# Patient Record
Sex: FEMALE | Race: WHITE | NOT HISPANIC OR LATINO | Employment: FULL TIME | URBAN - METROPOLITAN AREA
[De-identification: names, ages, dates, MRNs, and addresses within clinical notes are randomized per-mention and may not be internally consistent; named-entity substitution may affect disease eponyms.]

---

## 2017-06-21 ENCOUNTER — HOSPITAL ENCOUNTER (OUTPATIENT)
Dept: GENERAL RADIOLOGY | Facility: HOSPITAL | Age: 27
Discharge: HOME | End: 2017-06-21
Attending: NURSE PRACTITIONER | Admitting: NURSE PRACTITIONER

## 2017-07-27 ENCOUNTER — ON CAMPUS - OUTPATIENT (OUTPATIENT)
Dept: URBAN - METROPOLITAN AREA HOSPITAL 77 | Facility: HOSPITAL | Age: 27
End: 2017-07-27
Payer: COMMERCIAL

## 2017-07-27 DIAGNOSIS — K62.5 HEMORRHAGE OF ANUS AND RECTUM: ICD-10-CM

## 2017-07-27 DIAGNOSIS — K64.8 OTHER HEMORRHOIDS: ICD-10-CM

## 2017-07-27 PROCEDURE — 45378 DIAGNOSTIC COLONOSCOPY: CPT | Performed by: INTERNAL MEDICINE

## 2017-07-31 ENCOUNTER — HOSPITAL ENCOUNTER (OUTPATIENT)
Dept: OTHER | Facility: HOSPITAL | Age: 27
Setting detail: SPECIMEN
Discharge: HOME | End: 2017-07-31
Attending: NURSE PRACTITIONER | Admitting: NURSE PRACTITIONER

## 2017-07-31 LAB
ALBUMIN SERPL-MCNC: 4.2 G/DL (ref 3.5–4.8)
ALBUMIN/GLOB SERPL: 1.8 {RATIO} (ref 1–1.7)
ALP SERPL-CCNC: 51 IU/L (ref 32–91)
ALT SERPL-CCNC: 13 IU/L (ref 14–54)
ANION GAP SERPL CALC-SCNC: 17.7 MMOL/L (ref 10–20)
AST SERPL-CCNC: 18 IU/L (ref 15–41)
BASOPHILS # BLD AUTO: 0.1 10*3/UL (ref 0–0.2)
BASOPHILS NFR BLD AUTO: 1 % (ref 0–2)
BILIRUB SERPL-MCNC: 0.5 MG/DL (ref 0.3–1.2)
BUN SERPL-MCNC: 8 MG/DL (ref 8–20)
BUN/CREAT SERPL: 11.4 (ref 5.4–26.2)
CALCIUM SERPL-MCNC: 9.4 MG/DL (ref 8.9–10.3)
CHLORIDE SERPL-SCNC: 100 MMOL/L (ref 101–111)
CONV CO2: 28 MMOL/L (ref 22–32)
CONV TOTAL PROTEIN: 6.5 G/DL (ref 6.1–7.9)
CREAT UR-MCNC: 0.7 MG/DL (ref 0.4–1)
DIFFERENTIAL METHOD BLD: (no result)
EOSINOPHIL # BLD AUTO: 0.2 10*3/UL (ref 0–0.3)
EOSINOPHIL # BLD AUTO: 3 % (ref 0–3)
ERYTHROCYTE [DISTWIDTH] IN BLOOD BY AUTOMATED COUNT: 12.4 % (ref 11.5–14.5)
GLOBULIN UR ELPH-MCNC: 2.3 G/DL (ref 2.5–3.8)
GLUCOSE SERPL-MCNC: 76 MG/DL (ref 65–99)
HCT VFR BLD AUTO: 38.9 % (ref 35–49)
HGB BLD-MCNC: 13.4 G/DL (ref 12–15)
IRON SATN MFR SERPL: 51 % (ref 15–50)
IRON SERPL-MCNC: 174 UG/DL (ref 28–170)
LYMPHOCYTES # BLD AUTO: 2 10*3/UL (ref 0.8–4.8)
LYMPHOCYTES NFR BLD AUTO: 32 % (ref 18–42)
MCH RBC QN AUTO: 33.5 PG (ref 26–32)
MCHC RBC AUTO-ENTMCNC: 34.3 G/DL (ref 32–36)
MCV RBC AUTO: 97.5 FL (ref 80–94)
MONOCYTES # BLD AUTO: 0.5 10*3/UL (ref 0.1–1.3)
MONOCYTES NFR BLD AUTO: 8 % (ref 2–11)
NEUTROPHILS # BLD AUTO: 3.6 10*3/UL (ref 2.3–8.6)
NEUTROPHILS NFR BLD AUTO: 56 % (ref 50–75)
NRBC BLD AUTO-RTO: 0 /100{WBCS}
NRBC/RBC NFR BLD MANUAL: 0 10*3/UL
PLATELET # BLD AUTO: 239 10*3/UL (ref 150–450)
PMV BLD AUTO: 8.5 FL (ref 7.4–10.4)
POTASSIUM SERPL-SCNC: 3.7 MMOL/L (ref 3.6–5.1)
RBC # BLD AUTO: 3.99 10*6/UL (ref 4–5.4)
SODIUM SERPL-SCNC: 142 MMOL/L (ref 136–144)
TIBC SERPL-MCNC: 339 UG/DL (ref 228–428)
TSH SERPL-ACNC: 0.45 UIU/ML (ref 0.34–5.6)
VIT B12 SERPL-MCNC: 582 PG/ML (ref 180–914)
WBC # BLD AUTO: 6.3 10*3/UL (ref 4.5–11.5)

## 2017-09-13 ENCOUNTER — HOSPITAL ENCOUNTER (OUTPATIENT)
Dept: PHYSICAL THERAPY | Facility: HOSPITAL | Age: 27
Setting detail: RECURRING SERIES
Discharge: HOME | End: 2017-11-29
Attending: PHYSICIAN ASSISTANT | Admitting: PHYSICIAN ASSISTANT

## 2017-10-13 ENCOUNTER — HOSPITAL ENCOUNTER (OUTPATIENT)
Dept: INFUSION THERAPY | Facility: HOSPITAL | Age: 27
Discharge: HOME | End: 2017-10-13
Attending: INTERNAL MEDICINE | Admitting: INTERNAL MEDICINE

## 2017-10-13 LAB
CORTIS 1H P CHAL SERPL-MCNC: 25.8 UG/DL
CORTIS 30M P ACTH SERPL-MCNC: 25.5 UG/DL
CORTIS BS SERPL-MCNC: 19.9 UG/DL (ref 4–22)

## 2018-02-19 ENCOUNTER — ON CAMPUS - OUTPATIENT (OUTPATIENT)
Dept: URBAN - METROPOLITAN AREA HOSPITAL 85 | Facility: HOSPITAL | Age: 28
End: 2018-02-19
Payer: COMMERCIAL

## 2018-02-19 DIAGNOSIS — R50.9 FEVER, UNSPECIFIED: ICD-10-CM

## 2018-02-19 DIAGNOSIS — R11.2 NAUSEA WITH VOMITING, UNSPECIFIED: ICD-10-CM

## 2018-02-19 DIAGNOSIS — R93.3 ABNORMAL FINDINGS ON DIAGNOSTIC IMAGING OF OTHER PARTS OF DI: ICD-10-CM

## 2018-02-19 DIAGNOSIS — R19.7 DIARRHEA, UNSPECIFIED: ICD-10-CM

## 2018-02-19 DIAGNOSIS — R10.9 UNSPECIFIED ABDOMINAL PAIN: ICD-10-CM

## 2018-02-19 PROCEDURE — 99223 1ST HOSP IP/OBS HIGH 75: CPT | Performed by: NURSE PRACTITIONER

## 2018-02-20 ENCOUNTER — ON CAMPUS - OUTPATIENT (OUTPATIENT)
Dept: URBAN - METROPOLITAN AREA HOSPITAL 85 | Facility: HOSPITAL | Age: 28
End: 2018-02-20
Payer: COMMERCIAL

## 2018-02-20 DIAGNOSIS — R10.31 RIGHT LOWER QUADRANT PAIN: ICD-10-CM

## 2018-02-20 DIAGNOSIS — R11.2 NAUSEA WITH VOMITING, UNSPECIFIED: ICD-10-CM

## 2018-02-20 DIAGNOSIS — R19.7 DIARRHEA, UNSPECIFIED: ICD-10-CM

## 2018-02-20 DIAGNOSIS — R10.11 RIGHT UPPER QUADRANT PAIN: ICD-10-CM

## 2018-02-20 DIAGNOSIS — R93.3 ABNORMAL FINDINGS ON DIAGNOSTIC IMAGING OF OTHER PARTS OF DI: ICD-10-CM

## 2018-02-20 PROCEDURE — 99212 OFFICE O/P EST SF 10 MIN: CPT | Performed by: NURSE PRACTITIONER

## 2018-02-21 ENCOUNTER — ON CAMPUS - OUTPATIENT (OUTPATIENT)
Dept: URBAN - METROPOLITAN AREA HOSPITAL 85 | Facility: HOSPITAL | Age: 28
End: 2018-02-21
Payer: COMMERCIAL

## 2018-02-21 DIAGNOSIS — R10.31 RIGHT LOWER QUADRANT PAIN: ICD-10-CM

## 2018-02-21 DIAGNOSIS — R93.3 ABNORMAL FINDINGS ON DIAGNOSTIC IMAGING OF OTHER PARTS OF DI: ICD-10-CM

## 2018-02-21 DIAGNOSIS — R10.11 RIGHT UPPER QUADRANT PAIN: ICD-10-CM

## 2018-02-21 DIAGNOSIS — R11.2 NAUSEA WITH VOMITING, UNSPECIFIED: ICD-10-CM

## 2018-02-21 DIAGNOSIS — R19.7 DIARRHEA, UNSPECIFIED: ICD-10-CM

## 2018-02-21 PROCEDURE — 99213 OFFICE O/P EST LOW 20 MIN: CPT | Performed by: INTERNAL MEDICINE

## 2018-02-26 ENCOUNTER — OFFICE (OUTPATIENT)
Dept: URBAN - METROPOLITAN AREA CLINIC 64 | Facility: CLINIC | Age: 28
End: 2018-02-26
Payer: COMMERCIAL

## 2018-02-26 VITALS
HEART RATE: 76 BPM | SYSTOLIC BLOOD PRESSURE: 97 MMHG | WEIGHT: 74 LBS | DIASTOLIC BLOOD PRESSURE: 61 MMHG | HEIGHT: 61 IN

## 2018-02-26 DIAGNOSIS — R11.2 NAUSEA WITH VOMITING, UNSPECIFIED: ICD-10-CM

## 2018-02-26 DIAGNOSIS — K59.1 FUNCTIONAL DIARRHEA: ICD-10-CM

## 2018-02-26 DIAGNOSIS — R10.31 RIGHT LOWER QUADRANT PAIN: ICD-10-CM

## 2018-02-26 PROCEDURE — 99213 OFFICE O/P EST LOW 20 MIN: CPT | Performed by: NURSE PRACTITIONER

## 2018-02-26 RX ORDER — PROMETHAZINE HYDROCHLORIDE 25 MG/1
50 TABLET ORAL
Qty: 30 | Refills: 0 | Status: ACTIVE

## 2018-02-26 RX ORDER — COLESTIPOL HYDROCHLORIDE 1 G/1
2 TABLET, FILM COATED ORAL
Qty: 60 | Refills: 11 | Status: ACTIVE
Start: 2018-02-26

## 2018-02-28 ENCOUNTER — OFFICE (OUTPATIENT)
Dept: URBAN - METROPOLITAN AREA LAB 2 | Facility: LAB | Age: 28
End: 2018-02-28
Payer: COMMERCIAL

## 2018-02-28 DIAGNOSIS — K59.1 FUNCTIONAL DIARRHEA: ICD-10-CM

## 2018-02-28 PROCEDURE — 87507 IADNA-DNA/RNA PROBE TQ 12-25: CPT | Performed by: NURSE PRACTITIONER

## 2018-06-13 ENCOUNTER — HOSPITAL ENCOUNTER (OUTPATIENT)
Dept: OTHER | Facility: HOSPITAL | Age: 28
Setting detail: SPECIMEN
Discharge: HOME | End: 2018-06-13
Attending: NURSE PRACTITIONER | Admitting: NURSE PRACTITIONER

## 2018-06-13 LAB
ALBUMIN SERPL-MCNC: 4.4 G/DL (ref 3.5–4.8)
ALBUMIN/GLOB SERPL: 1.8 {RATIO} (ref 1–1.7)
ALP SERPL-CCNC: 53 IU/L (ref 32–91)
ALT SERPL-CCNC: 15 IU/L (ref 14–54)
ANION GAP SERPL CALC-SCNC: 11.2 MMOL/L (ref 10–20)
AST SERPL-CCNC: 20 IU/L (ref 15–41)
BASOPHILS # BLD AUTO: 0 10*3/UL (ref 0–0.2)
BASOPHILS NFR BLD AUTO: 1 % (ref 0–2)
BILIRUB SERPL-MCNC: 0.4 MG/DL (ref 0.3–1.2)
BUN SERPL-MCNC: 15 MG/DL (ref 8–20)
BUN/CREAT SERPL: 18.8 (ref 5.4–26.2)
CALCIUM SERPL-MCNC: 9.7 MG/DL (ref 8.9–10.3)
CHLORIDE SERPL-SCNC: 100 MMOL/L (ref 101–111)
CONV CO2: 30 MMOL/L (ref 22–32)
CONV TOTAL PROTEIN: 6.8 G/DL (ref 6.1–7.9)
CREAT UR-MCNC: 0.8 MG/DL (ref 0.4–1)
DIFFERENTIAL METHOD BLD: (no result)
EOSINOPHIL # BLD AUTO: 0.1 10*3/UL (ref 0–0.3)
EOSINOPHIL # BLD AUTO: 2 % (ref 0–3)
ERYTHROCYTE [DISTWIDTH] IN BLOOD BY AUTOMATED COUNT: 13.2 % (ref 11.5–14.5)
GLOBULIN UR ELPH-MCNC: 2.4 G/DL (ref 2.5–3.8)
GLUCOSE SERPL-MCNC: 76 MG/DL (ref 65–99)
HCT VFR BLD AUTO: 43.6 % (ref 35–49)
HGB BLD-MCNC: 14.5 G/DL (ref 12–15)
IRON SATN MFR SERPL: 31 % (ref 15–50)
IRON SERPL-MCNC: 125 UG/DL (ref 28–170)
LYMPHOCYTES # BLD AUTO: 2 10*3/UL (ref 0.8–4.8)
LYMPHOCYTES NFR BLD AUTO: 30 % (ref 18–42)
MCH RBC QN AUTO: 33.5 PG (ref 26–32)
MCHC RBC AUTO-ENTMCNC: 33.4 G/DL (ref 32–36)
MCV RBC AUTO: 100.4 FL (ref 80–94)
MONOCYTES # BLD AUTO: 0.6 10*3/UL (ref 0.1–1.3)
MONOCYTES NFR BLD AUTO: 9 % (ref 2–11)
NEUTROPHILS # BLD AUTO: 4 10*3/UL (ref 2.3–8.6)
NEUTROPHILS NFR BLD AUTO: 58 % (ref 50–75)
NRBC BLD AUTO-RTO: 0 /100{WBCS}
NRBC/RBC NFR BLD MANUAL: 0 10*3/UL
PLATELET # BLD AUTO: 299 10*3/UL (ref 150–450)
PMV BLD AUTO: 8.2 FL (ref 7.4–10.4)
POTASSIUM SERPL-SCNC: 4.2 MMOL/L (ref 3.6–5.1)
RBC # BLD AUTO: 4.34 10*6/UL (ref 4–5.4)
SODIUM SERPL-SCNC: 137 MMOL/L (ref 136–144)
TIBC SERPL-MCNC: 398 UG/DL (ref 228–428)
WBC # BLD AUTO: 6.7 10*3/UL (ref 4.5–11.5)

## 2018-07-11 ENCOUNTER — HOSPITAL ENCOUNTER (OUTPATIENT)
Dept: LAB | Facility: HOSPITAL | Age: 28
Discharge: HOME | End: 2018-07-11
Attending: NURSE PRACTITIONER | Admitting: NURSE PRACTITIONER

## 2018-07-11 LAB
ALBUMIN SERPL-MCNC: 4.1 G/DL (ref 3.5–4.8)
ALBUMIN/GLOB SERPL: 2 {RATIO} (ref 1–1.7)
ALP SERPL-CCNC: 44 IU/L (ref 32–91)
ALT SERPL-CCNC: 12 IU/L (ref 14–54)
AMYLASE SERPL-CCNC: 50 U/L (ref 36–128)
ANION GAP SERPL CALC-SCNC: 10.2 MMOL/L (ref 10–20)
AST SERPL-CCNC: 18 IU/L (ref 15–41)
BASOPHILS # BLD AUTO: 0 10*3/UL (ref 0–0.2)
BASOPHILS NFR BLD AUTO: 1 % (ref 0–2)
BILIRUB SERPL-MCNC: 0.4 MG/DL (ref 0.3–1.2)
BUN SERPL-MCNC: 18 MG/DL (ref 8–20)
BUN/CREAT SERPL: 25.7 (ref 5.4–26.2)
CALCIUM SERPL-MCNC: 9.1 MG/DL (ref 8.9–10.3)
CHLORIDE SERPL-SCNC: 104 MMOL/L (ref 101–111)
CONV CO2: 28 MMOL/L (ref 22–32)
CONV TOTAL PROTEIN: 6.2 G/DL (ref 6.1–7.9)
CREAT UR-MCNC: 0.7 MG/DL (ref 0.4–1)
DIFFERENTIAL METHOD BLD: (no result)
EOSINOPHIL # BLD AUTO: 0.1 10*3/UL (ref 0–0.3)
EOSINOPHIL # BLD AUTO: 2 % (ref 0–3)
ERYTHROCYTE [DISTWIDTH] IN BLOOD BY AUTOMATED COUNT: 13 % (ref 11.5–14.5)
GLOBULIN UR ELPH-MCNC: 2.1 G/DL (ref 2.5–3.8)
GLUCOSE SERPL-MCNC: 80 MG/DL (ref 65–99)
HCT VFR BLD AUTO: 39 % (ref 35–49)
HGB BLD-MCNC: 13.2 G/DL (ref 12–15)
LIPASE SERPL-CCNC: 36 U/L (ref 22–51)
LYMPHOCYTES # BLD AUTO: 1.7 10*3/UL (ref 0.8–4.8)
LYMPHOCYTES NFR BLD AUTO: 30 % (ref 18–42)
MCH RBC QN AUTO: 34.5 PG (ref 26–32)
MCHC RBC AUTO-ENTMCNC: 33.9 G/DL (ref 32–36)
MCV RBC AUTO: 101.7 FL (ref 80–94)
MONOCYTES # BLD AUTO: 0.5 10*3/UL (ref 0.1–1.3)
MONOCYTES NFR BLD AUTO: 9 % (ref 2–11)
NEUTROPHILS # BLD AUTO: 3.4 10*3/UL (ref 2.3–8.6)
NEUTROPHILS NFR BLD AUTO: 58 % (ref 50–75)
NRBC BLD AUTO-RTO: 0 /100{WBCS}
NRBC/RBC NFR BLD MANUAL: 0 10*3/UL
PLATELET # BLD AUTO: 258 10*3/UL (ref 150–450)
PMV BLD AUTO: 8.2 FL (ref 7.4–10.4)
POTASSIUM SERPL-SCNC: 4.2 MMOL/L (ref 3.6–5.1)
RBC # BLD AUTO: 3.83 10*6/UL (ref 4–5.4)
SODIUM SERPL-SCNC: 138 MMOL/L (ref 136–144)
WBC # BLD AUTO: 5.7 10*3/UL (ref 4.5–11.5)

## 2018-08-14 ENCOUNTER — HOSPITAL ENCOUNTER (OUTPATIENT)
Dept: OTHER | Facility: HOSPITAL | Age: 28
Setting detail: SPECIMEN
Discharge: HOME | End: 2018-08-14
Attending: NURSE PRACTITIONER | Admitting: NURSE PRACTITIONER

## 2018-08-14 LAB
C TRACH RRNA SPEC QL PROBE: NORMAL
N GONORRHOEA RRNA SPEC QL PROBE: NORMAL
SPECIMEN SOURCE: NORMAL

## 2018-08-15 LAB
CONV HIV-1/ HIV-2: NORMAL
CONV HIV-1/ HIV-2: NORMAL
HCV AB SER DONR QL: NORMAL
HCV AB SER DONR QL: NORMAL
T PALLIDUM IGG SER QL: NONREACTIVE

## 2019-04-22 ENCOUNTER — HOSPITAL ENCOUNTER (OUTPATIENT)
Dept: OTHER | Facility: HOSPITAL | Age: 29
Setting detail: SPECIMEN
Discharge: HOME | End: 2019-04-22
Attending: NURSE PRACTITIONER | Admitting: NURSE PRACTITIONER

## 2019-04-22 LAB
ALBUMIN SERPL-MCNC: 4.5 G/DL (ref 3.5–4.8)
ALBUMIN/GLOB SERPL: 1.9 {RATIO} (ref 1–1.7)
ALP SERPL-CCNC: 59 IU/L (ref 32–91)
ALT SERPL-CCNC: 18 IU/L (ref 14–54)
ANION GAP SERPL CALC-SCNC: 14.1 MMOL/L (ref 10–20)
AST SERPL-CCNC: 19 IU/L (ref 15–41)
BASOPHILS # BLD AUTO: 0.1 10*3/UL (ref 0–0.2)
BASOPHILS NFR BLD AUTO: 1 % (ref 0–2)
BILIRUB SERPL-MCNC: 0.5 MG/DL (ref 0.3–1.2)
BUN SERPL-MCNC: 8 MG/DL (ref 8–20)
BUN/CREAT SERPL: 11.4 (ref 5.4–26.2)
CALCIUM SERPL-MCNC: 9.5 MG/DL (ref 8.9–10.3)
CHLORIDE SERPL-SCNC: 104 MMOL/L (ref 101–111)
CONV CO2: 23 MMOL/L (ref 22–32)
CONV TOTAL PROTEIN: 6.9 G/DL (ref 6.1–7.9)
CREAT UR-MCNC: 0.7 MG/DL (ref 0.4–1)
DIFFERENTIAL METHOD BLD: (no result)
EOSINOPHIL # BLD AUTO: 0.1 10*3/UL (ref 0–0.3)
EOSINOPHIL # BLD AUTO: 1 % (ref 0–3)
ERYTHROCYTE [DISTWIDTH] IN BLOOD BY AUTOMATED COUNT: 13 % (ref 11.5–14.5)
GLOBULIN UR ELPH-MCNC: 2.4 G/DL (ref 2.5–3.8)
GLUCOSE SERPL-MCNC: 110 MG/DL (ref 65–99)
HCT VFR BLD AUTO: 40.4 % (ref 35–49)
HGB BLD-MCNC: 13.7 G/DL (ref 12–15)
LYMPHOCYTES # BLD AUTO: 3.8 10*3/UL (ref 0.8–4.8)
LYMPHOCYTES NFR BLD AUTO: 40 % (ref 18–42)
MCH RBC QN AUTO: 33.6 PG (ref 26–32)
MCHC RBC AUTO-ENTMCNC: 33.9 G/DL (ref 32–36)
MCV RBC AUTO: 99.1 FL (ref 80–94)
MONOCYTES # BLD AUTO: 1 10*3/UL (ref 0.1–1.3)
MONOCYTES NFR BLD AUTO: 11 % (ref 2–11)
NEUTROPHILS # BLD AUTO: 4.5 10*3/UL (ref 2.3–8.6)
NEUTROPHILS NFR BLD AUTO: 47 % (ref 50–75)
NRBC BLD AUTO-RTO: 0 /100{WBCS}
NRBC/RBC NFR BLD MANUAL: 0 10*3/UL
PLATELET # BLD AUTO: 302 10*3/UL (ref 150–450)
PMV BLD AUTO: 7.8 FL (ref 7.4–10.4)
POTASSIUM SERPL-SCNC: 3.1 MMOL/L (ref 3.6–5.1)
RBC # BLD AUTO: 4.07 10*6/UL (ref 4–5.4)
SODIUM SERPL-SCNC: 138 MMOL/L (ref 136–144)
WBC # BLD AUTO: 9.5 10*3/UL (ref 4.5–11.5)

## 2019-09-01 ENCOUNTER — APPOINTMENT (OUTPATIENT)
Dept: GENERAL RADIOLOGY | Facility: HOSPITAL | Age: 29
End: 2019-09-01

## 2019-09-01 ENCOUNTER — HOSPITAL ENCOUNTER (EMERGENCY)
Facility: HOSPITAL | Age: 29
Discharge: HOME | End: 2019-09-01
Admitting: EMERGENCY MEDICINE

## 2019-09-01 VITALS
OXYGEN SATURATION: 99 % | DIASTOLIC BLOOD PRESSURE: 73 MMHG | BODY MASS INDEX: 20.52 KG/M2 | RESPIRATION RATE: 18 BRPM | HEART RATE: 80 BPM | WEIGHT: 104.5 LBS | TEMPERATURE: 97.9 F | HEIGHT: 60 IN | SYSTOLIC BLOOD PRESSURE: 114 MMHG

## 2019-09-01 DIAGNOSIS — S93.402A SPRAIN OF LEFT ANKLE, UNSPECIFIED LIGAMENT, INITIAL ENCOUNTER: Primary | ICD-10-CM

## 2019-09-01 PROCEDURE — 99283 EMERGENCY DEPT VISIT LOW MDM: CPT

## 2019-09-01 PROCEDURE — 73610 X-RAY EXAM OF ANKLE: CPT

## 2019-09-01 RX ORDER — IBUPROFEN 800 MG/1
800 TABLET ORAL EVERY 6 HOURS PRN
Qty: 30 TABLET | Refills: 0 | Status: SHIPPED | OUTPATIENT
Start: 2019-09-01 | End: 2021-09-20

## 2019-09-01 NOTE — ED PROVIDER NOTES
Subjective   Chief Complaint: Left ankle pain  Context: Patient complains of left ankle pain.  She reports that she just stood on a trampoline she did not jump and now she cannot bear weight on her left ankle.  Duration: Today  Timing: Constant  Severity: Moderate  Associated symptoms and or modifying factors: As above.  No prior fractures.          History provided by:  Patient      Review of Systems   Constitutional: Negative for fever.   Musculoskeletal: Positive for gait problem and joint swelling.   Skin: Negative for wound.   Neurological: Negative for numbness.       No past medical history on file.    Allergies   Allergen Reactions   • Tramadol Shortness Of Breath   • Tramadol Hcl Unknown (See Comments)   • Acetaminophen-Codeine Unknown (See Comments)   • Other Unknown (See Comments)     Muscle relaxers       No past surgical history on file.    No family history on file.    Social History     Socioeconomic History   • Marital status:      Spouse name: Not on file   • Number of children: Not on file   • Years of education: Not on file   • Highest education level: Not on file           Objective   Physical Exam  The patient is well-developed, well-nourished, alert, cooperative and in no acute distress.    HEENT exam is normocephalic and atraumatic.    Extremities: There is no significant deformity or joint abnormality. No edema. Peripheral pulses are intact.  Pain on palpation of the left lateral malleolus    Neurologic: Oriented x3 without focal neurological deficits.    Skin shows no rash, petechiae, or purpura.    Procedures           ED Course        Labs Reviewed - No data to display  Xr Ankle 3+ View Left    Result Date: 9/1/2019  Normal exam.  Electronically Signed By-Susanne Sim On:9/1/2019 5:05 PM This report was finalized on [id number] by  Susanne Sim, .    Medications - No data to display  /78 (BP Location: Left arm, Patient Position: Sitting)   Pulse 87   Temp 98.4 °F (36.9  "°C) (Oral)   Resp 16   Ht 152.4 cm (60\")   Wt 47.4 kg (104 lb 8 oz)   SpO2 98%   BMI 20.41 kg/m²             MDM  Number of Diagnoses or Management Options  Sprain of left ankle, unspecified ligament, initial encounter:   Diagnosis management comments: MEDICAL DECISION  Comorbidities: Denies  Differentials: Fracture sprain; this list is not all inclusive and does not constitute the entirety of considered causes  Radiology interpretation:  Images reviewed by me and interpreted by radiologist, x-ray of the left ankle no fracture    Patient was placed in Aircast and given crutches.  Plan for discharge was discussed.  Prescription for ibuprofen.       Amount and/or Complexity of Data Reviewed  Tests in the radiology section of CPT®: reviewed and ordered          Final diagnoses:   Sprain of left ankle, unspecified ligament, initial encounter            Akosua Pepper, LEEANNA  09/01/19 1731    "

## 2019-09-01 NOTE — DISCHARGE INSTRUCTIONS
Ice and elevate.  Aircast for comfort.  Crutches with weightbearing as tolerated.  Ibuprofen as prescribed for

## 2020-10-20 ENCOUNTER — HOSPITAL ENCOUNTER (EMERGENCY)
Facility: HOSPITAL | Age: 30
Discharge: HOME | End: 2020-10-20
Attending: EMERGENCY MEDICINE | Admitting: EMERGENCY MEDICINE

## 2020-10-20 ENCOUNTER — APPOINTMENT (OUTPATIENT)
Dept: GENERAL RADIOLOGY | Facility: HOSPITAL | Age: 30
End: 2020-10-20

## 2020-10-20 VITALS
OXYGEN SATURATION: 100 % | BODY MASS INDEX: 18.14 KG/M2 | SYSTOLIC BLOOD PRESSURE: 109 MMHG | HEIGHT: 60 IN | RESPIRATION RATE: 18 BRPM | DIASTOLIC BLOOD PRESSURE: 63 MMHG | HEART RATE: 56 BPM | TEMPERATURE: 97.3 F | WEIGHT: 92.37 LBS

## 2020-10-20 DIAGNOSIS — R07.9 CHEST PAIN, UNSPECIFIED TYPE: Primary | ICD-10-CM

## 2020-10-20 LAB
ALBUMIN SERPL-MCNC: 3.9 G/DL (ref 3.5–5.2)
ALBUMIN/GLOB SERPL: 2.4 G/DL
ALP SERPL-CCNC: 43 U/L (ref 39–117)
ALT SERPL W P-5'-P-CCNC: 8 U/L (ref 1–33)
ANION GAP SERPL CALCULATED.3IONS-SCNC: 9 MMOL/L (ref 5–15)
AST SERPL-CCNC: 10 U/L (ref 1–32)
BASOPHILS # BLD AUTO: 0.1 10*3/MM3 (ref 0–0.2)
BASOPHILS NFR BLD AUTO: 1.7 % (ref 0–1.5)
BILIRUB SERPL-MCNC: <0.2 MG/DL (ref 0–1.2)
BUN SERPL-MCNC: 14 MG/DL (ref 6–20)
BUN/CREAT SERPL: 30.4 (ref 7–25)
CALCIUM SPEC-SCNC: 9 MG/DL (ref 8.6–10.5)
CHLORIDE SERPL-SCNC: 104 MMOL/L (ref 98–107)
CO2 SERPL-SCNC: 27 MMOL/L (ref 22–29)
CREAT SERPL-MCNC: 0.46 MG/DL (ref 0.57–1)
D DIMER PPP FEU-MCNC: 0.28 MG/L (FEU) (ref 0–0.59)
DEPRECATED RDW RBC AUTO: 43.8 FL (ref 37–54)
EOSINOPHIL # BLD AUTO: 0 10*3/MM3 (ref 0–0.4)
EOSINOPHIL NFR BLD AUTO: 0.9 % (ref 0.3–6.2)
ERYTHROCYTE [DISTWIDTH] IN BLOOD BY AUTOMATED COUNT: 12.7 % (ref 12.3–15.4)
GFR SERPL CREATININE-BSD FRML MDRD: >150 ML/MIN/1.73
GLOBULIN UR ELPH-MCNC: 1.6 GM/DL
GLUCOSE SERPL-MCNC: 112 MG/DL (ref 65–99)
HCT VFR BLD AUTO: 38.5 % (ref 34–46.6)
HGB BLD-MCNC: 13.1 G/DL (ref 12–15.9)
LYMPHOCYTES # BLD AUTO: 1.5 10*3/MM3 (ref 0.7–3.1)
LYMPHOCYTES NFR BLD AUTO: 30.8 % (ref 19.6–45.3)
MCH RBC QN AUTO: 33.7 PG (ref 26.6–33)
MCHC RBC AUTO-ENTMCNC: 34.1 G/DL (ref 31.5–35.7)
MCV RBC AUTO: 98.9 FL (ref 79–97)
MONOCYTES # BLD AUTO: 0.3 10*3/MM3 (ref 0.1–0.9)
MONOCYTES NFR BLD AUTO: 6 % (ref 5–12)
NEUTROPHILS NFR BLD AUTO: 3 10*3/MM3 (ref 1.7–7)
NEUTROPHILS NFR BLD AUTO: 60.6 % (ref 42.7–76)
NRBC BLD AUTO-RTO: 0 /100 WBC (ref 0–0.2)
PLATELET # BLD AUTO: 219 10*3/MM3 (ref 140–450)
PMV BLD AUTO: 8.1 FL (ref 6–12)
POTASSIUM SERPL-SCNC: 3.9 MMOL/L (ref 3.5–5.2)
PROT SERPL-MCNC: 5.5 G/DL (ref 6–8.5)
RBC # BLD AUTO: 3.9 10*6/MM3 (ref 3.77–5.28)
SODIUM SERPL-SCNC: 140 MMOL/L (ref 136–145)
TROPONIN T SERPL-MCNC: <0.01 NG/ML (ref 0–0.03)
WBC # BLD AUTO: 5 10*3/MM3 (ref 3.4–10.8)

## 2020-10-20 PROCEDURE — 96376 TX/PRO/DX INJ SAME DRUG ADON: CPT

## 2020-10-20 PROCEDURE — 25010000002 KETOROLAC TROMETHAMINE PER 15 MG: Performed by: EMERGENCY MEDICINE

## 2020-10-20 PROCEDURE — 99283 EMERGENCY DEPT VISIT LOW MDM: CPT

## 2020-10-20 PROCEDURE — 85379 FIBRIN DEGRADATION QUANT: CPT | Performed by: EMERGENCY MEDICINE

## 2020-10-20 PROCEDURE — 93005 ELECTROCARDIOGRAM TRACING: CPT | Performed by: EMERGENCY MEDICINE

## 2020-10-20 PROCEDURE — 96374 THER/PROPH/DIAG INJ IV PUSH: CPT

## 2020-10-20 PROCEDURE — 80053 COMPREHEN METABOLIC PANEL: CPT | Performed by: EMERGENCY MEDICINE

## 2020-10-20 PROCEDURE — 84484 ASSAY OF TROPONIN QUANT: CPT | Performed by: EMERGENCY MEDICINE

## 2020-10-20 PROCEDURE — 71045 X-RAY EXAM CHEST 1 VIEW: CPT

## 2020-10-20 PROCEDURE — 85025 COMPLETE CBC W/AUTO DIFF WBC: CPT | Performed by: EMERGENCY MEDICINE

## 2020-10-20 PROCEDURE — 25010000002 KETOROLAC TROMETHAMINE PER 15 MG: Performed by: NURSE PRACTITIONER

## 2020-10-20 RX ORDER — SODIUM CHLORIDE 0.9 % (FLUSH) 0.9 %
10 SYRINGE (ML) INJECTION AS NEEDED
Status: DISCONTINUED | OUTPATIENT
Start: 2020-10-20 | End: 2020-10-20 | Stop reason: HOSPADM

## 2020-10-20 RX ORDER — KETOROLAC TROMETHAMINE 15 MG/ML
15 INJECTION, SOLUTION INTRAMUSCULAR; INTRAVENOUS ONCE
Status: COMPLETED | OUTPATIENT
Start: 2020-10-20 | End: 2020-10-20

## 2020-10-20 RX ORDER — ESTRADIOL 0.5 MG/1
0.5 TABLET ORAL 3 TIMES DAILY
COMMUNITY
Start: 2017-08-16 | End: 2021-09-24 | Stop reason: DRUGHIGH

## 2020-10-20 RX ADMIN — KETOROLAC TROMETHAMINE 15 MG: 15 INJECTION, SOLUTION INTRAMUSCULAR; INTRAVENOUS at 17:37

## 2020-10-20 RX ADMIN — KETOROLAC TROMETHAMINE 15 MG: 15 INJECTION, SOLUTION INTRAMUSCULAR; INTRAVENOUS at 14:29

## 2020-10-20 NOTE — DISCHARGE INSTRUCTIONS
Please follow up with your primary care provider: if you do not have one, one has been provided above and you may call for an appointment for primary care.  Please have your primary care provider and/or cardiology, call for an appointment  Return the ED for worsening symptoms

## 2020-10-20 NOTE — ED NOTES
Chest pain under left breast and down into left upper arm. Started 2 days ago. Describes pain as dull at times and sharp at time     Cecy Hernandez, RN  10/20/20 [id number]

## 2020-10-20 NOTE — ED PROVIDER NOTES
Subjective   Chief complaint chest pain    History of present illness 30-year-old female who complains of 2-day history of some sharp pain in her chest that rates into her shoulder and down her left arm.  She states that her fingers feel numb.  Has been mostly constant but will increase in intensity intermittently.  He states she was at work today and has been present all day and they sent her from work to the ER.  Denies any shortness of breath no dizziness or syncope.  No fever chills sweats cough congestion no leg pain or swelling no recent long car ride plane ride immobilization foreign travels.  Nothing really makes it better or worse it is not associated with exertion.  And denies any injury.  Symptoms have been moderate 2 days mostly continuous with intermittent increasing in intensity no triggers nothing makes it better          Review of Systems   Constitutional: Negative for chills and fever.   HENT: Negative for congestion and sinus pressure.    Eyes: Negative for photophobia and visual disturbance.   Respiratory: Positive for chest tightness. Negative for shortness of breath.    Cardiovascular: Positive for chest pain. Negative for leg swelling.   Gastrointestinal: Negative for abdominal pain and vomiting.   Endocrine: Negative for cold intolerance and heat intolerance.   Genitourinary: Negative for difficulty urinating and dysuria.   Musculoskeletal: Negative for arthralgias and back pain.   Skin: Negative for color change and pallor.   Neurological: Negative for dizziness and light-headedness.   Psychiatric/Behavioral: Negative for agitation and behavioral problems.       History reviewed. No pertinent past medical history.    Allergies   Allergen Reactions   • Tramadol Shortness Of Breath   • Tramadol Hcl Unknown (See Comments)   • Acetaminophen-Codeine Unknown (See Comments)   • Other Unknown (See Comments)     Muscle relaxers       Past Surgical History:   Procedure Laterality Date   •  CHOLECYSTECTOMY     • DILATATION AND CURETTAGE     • HYSTERECTOMY     • INNER EAR SURGERY     • TUBAL ABDOMINAL LIGATION         History reviewed. No pertinent family history.    Social History     Socioeconomic History   • Marital status:      Spouse name: Not on file   • Number of children: Not on file   • Years of education: Not on file   • Highest education level: Not on file   Tobacco Use   • Smoking status: Current Every Day Smoker     Packs/day: 1.00   Substance and Sexual Activity   • Alcohol use: Yes   • Drug use: Never   • Sexual activity: Yes     Partners: Male     Prior to Admission medications    Medication Sig Start Date End Date Taking? Authorizing Provider   estradiol (ESTRACE) 0.5 MG tablet ESTRADIOL TABS 8/16/17  Yes Provider, MD Miranda   ibuprofen (ADVIL,MOTRIN) 800 MG tablet Take 1 tablet by mouth Every 6 (Six) Hours As Needed for Moderate Pain . 9/1/19   Akosua Pepper, APRN           Objective   Physical Exam  30-year-old female awake alert no acute distress HEENT extraocular muscles intact pupils equal round reactive sclera clear mouth clear neck supple no adenopathy no JVD lungs clear no retractions heart regular without murmur abdomen was soft without tenderness good bowel sounds no pulsatile masses extremities pulses are equal throughout upper and lower extremities no edema cords or Homans' sign or evidence of DVT.  Patient's awake alert follows commands motor strength normal without focal weakness that left arm is not red or hot or swollen is warm and dry good cap refill there is no pain.  She moves without difficulty no swelling evidence of DVT good cap refill good and equal radial pulses.  Evidence of infection or DVT or vascular compromise there is no focal deficit associated deficit to suggest a stroke.  Procedures           ED Course  ED Course as of Oct 20 2145   Tue Oct 20, 2020   2145 eGFR Non  Am: >150 [LB]      ED Course User Index  [LB] Milly Werner,  APRN      Results for orders placed or performed during the hospital encounter of 10/20/20   Comprehensive Metabolic Panel    Specimen: Blood   Result Value Ref Range    Glucose 112 (H) 65 - 99 mg/dL    BUN 14 6 - 20 mg/dL    Creatinine 0.46 (L) 0.57 - 1.00 mg/dL    Sodium 140 136 - 145 mmol/L    Potassium 3.9 3.5 - 5.2 mmol/L    Chloride 104 98 - 107 mmol/L    CO2 27.0 22.0 - 29.0 mmol/L    Calcium 9.0 8.6 - 10.5 mg/dL    Total Protein 5.5 (L) 6.0 - 8.5 g/dL    Albumin 3.90 3.50 - 5.20 g/dL    ALT (SGPT) 8 1 - 33 U/L    AST (SGOT) 10 1 - 32 U/L    Alkaline Phosphatase 43 39 - 117 U/L    Total Bilirubin <0.2 0.0 - 1.2 mg/dL    eGFR Non African Amer >150 >60 mL/min/1.73    Globulin 1.6 gm/dL    A/G Ratio 2.4 g/dL    BUN/Creatinine Ratio 30.4 (H) 7.0 - 25.0    Anion Gap 9.0 5.0 - 15.0 mmol/L   Troponin    Specimen: Blood   Result Value Ref Range    Troponin T <0.010 0.000 - 0.030 ng/mL   D-dimer, Quantitative    Specimen: Blood   Result Value Ref Range    D-Dimer, Quantitative 0.28 0.00 - 0.59 mg/L (FEU)   CBC Auto Differential    Specimen: Blood   Result Value Ref Range    WBC 5.00 3.40 - 10.80 10*3/mm3    RBC 3.90 3.77 - 5.28 10*6/mm3    Hemoglobin 13.1 12.0 - 15.9 g/dL    Hematocrit 38.5 34.0 - 46.6 %    MCV 98.9 (H) 79.0 - 97.0 fL    MCH 33.7 (H) 26.6 - 33.0 pg    MCHC 34.1 31.5 - 35.7 g/dL    RDW 12.7 12.3 - 15.4 %    RDW-SD 43.8 37.0 - 54.0 fl    MPV 8.1 6.0 - 12.0 fL    Platelets 219 140 - 450 10*3/mm3    Neutrophil % 60.6 42.7 - 76.0 %    Lymphocyte % 30.8 19.6 - 45.3 %    Monocyte % 6.0 5.0 - 12.0 %    Eosinophil % 0.9 0.3 - 6.2 %    Basophil % 1.7 (H) 0.0 - 1.5 %    Neutrophils, Absolute 3.00 1.70 - 7.00 10*3/mm3    Lymphocytes, Absolute 1.50 0.70 - 3.10 10*3/mm3    Monocytes, Absolute 0.30 0.10 - 0.90 10*3/mm3    Eosinophils, Absolute 0.00 0.00 - 0.40 10*3/mm3    Basophils, Absolute 0.10 0.00 - 0.20 10*3/mm3    nRBC 0.0 0.0 - 0.2 /100 WBC     Xr Chest 1 View    Result Date: 10/20/2020  Mildly limited study  demonstrating no active disease.  Electronically Signed By-Jamarcus Montalvo On:10/20/2020 3:26 PM This report was finalized on [id number] by  Jamarcus Montalvo, .    Medications   ketorolac (TORADOL) injection 15 mg (15 mg Intravenous Given 10/20/20 1429)   ketorolac (TORADOL) injection 15 mg (15 mg Intravenous Given 10/20/20 1737)     KG my interpretation normal sinus rhythm rate of 70 normal axis no hypertrophy QTC of 415 is a normal EKG                                       MDM  Number of Diagnoses or Management Options  Chest pain, unspecified type:   Diagnosis management comments: Medical decision making.  Patient IV established placed on the monitor was given Toradol 15 mg IV and had the above exam evaluation CBC unremarkable D-dimer normal.  Patient EKG was normal.  Chest x-ray negative the patient had been given Toradol 15 mg IV.  On repeat examination at 4:20 PM the patient was resting comfortably much better.  The patient had to have her blood drawn again for a second time as the first 2 were hemolyzed.  So chemistries are currently pending.  I talked to the patient she is feeling much better after Toradol this was turned over to the nurse practitioner Milly and will follow up the results.  Patient's overall heart risk is fairly low.  She is a smoker and has some family history of give her heart score of 2 but her pain is been constant for 2 days and not exertion related there are multiple etiologies that could cause this.  We will see what the troponin shows.  D-dimer is negative there is no leg pain or swelling there is oxygen saturations have been normal patient's respiratory rate is 18 heart rates in the 80s.  No evidence suggest acute aortic dissection.  EKG suggests no ischemia.      Patient's troponin is negative.  CMP nonconcerning.  Her D-dimer is negative.  On reexam patient is afebrile nontoxic in appearance.  We will continue with the plan for patient to be discharged home, and follow-up with her  primary care provider, she is also given information to follow-up with cardiology.  She was given a repeat dose of Toradol prior to discharge, as it helped her pain significantly earlier.    Final diagnoses:   Chest pain, unspecified type            Milly Werner, APRN  10/20/20 1338

## 2021-09-20 ENCOUNTER — APPOINTMENT (OUTPATIENT)
Dept: GENERAL RADIOLOGY | Facility: HOSPITAL | Age: 31
End: 2021-09-20

## 2021-09-20 ENCOUNTER — HOSPITAL ENCOUNTER (OUTPATIENT)
Facility: HOSPITAL | Age: 31
Setting detail: OBSERVATION
Discharge: LEFT BEFORE DISCHARGE | End: 2021-09-20
Attending: EMERGENCY MEDICINE | Admitting: EMERGENCY MEDICINE

## 2021-09-20 VITALS
HEART RATE: 66 BPM | HEIGHT: 61 IN | RESPIRATION RATE: 20 BRPM | TEMPERATURE: 97.5 F | SYSTOLIC BLOOD PRESSURE: 112 MMHG | DIASTOLIC BLOOD PRESSURE: 81 MMHG | BODY MASS INDEX: 15.53 KG/M2 | OXYGEN SATURATION: 100 % | WEIGHT: 82.23 LBS

## 2021-09-20 DIAGNOSIS — R07.9 CHEST PAIN, UNSPECIFIED TYPE: Primary | ICD-10-CM

## 2021-09-20 LAB
ALBUMIN SERPL-MCNC: 4.4 G/DL (ref 3.5–5.2)
ALBUMIN/GLOB SERPL: 1.9 G/DL
ALP SERPL-CCNC: 47 U/L (ref 39–117)
ALT SERPL W P-5'-P-CCNC: 11 U/L (ref 1–33)
ANION GAP SERPL CALCULATED.3IONS-SCNC: 11 MMOL/L (ref 5–15)
AST SERPL-CCNC: 15 U/L (ref 1–32)
BASOPHILS # BLD AUTO: 0 10*3/MM3 (ref 0–0.2)
BASOPHILS NFR BLD AUTO: 0.5 % (ref 0–1.5)
BILIRUB SERPL-MCNC: 0.4 MG/DL (ref 0–1.2)
BUN SERPL-MCNC: 12 MG/DL (ref 6–20)
BUN/CREAT SERPL: 19.4 (ref 7–25)
CALCIUM SPEC-SCNC: 8.6 MG/DL (ref 8.6–10.5)
CHLORIDE SERPL-SCNC: 98 MMOL/L (ref 98–107)
CO2 SERPL-SCNC: 25 MMOL/L (ref 22–29)
CREAT SERPL-MCNC: 0.62 MG/DL (ref 0.57–1)
D DIMER PPP FEU-MCNC: 0.21 MG/L (FEU) (ref 0–0.59)
DEPRECATED RDW RBC AUTO: 45.1 FL (ref 37–54)
EOSINOPHIL # BLD AUTO: 0.1 10*3/MM3 (ref 0–0.4)
EOSINOPHIL NFR BLD AUTO: 1.2 % (ref 0.3–6.2)
ERYTHROCYTE [DISTWIDTH] IN BLOOD BY AUTOMATED COUNT: 13.1 % (ref 12.3–15.4)
GFR SERPL CREATININE-BSD FRML MDRD: 112 ML/MIN/1.73
GLOBULIN UR ELPH-MCNC: 2.3 GM/DL
GLUCOSE SERPL-MCNC: 102 MG/DL (ref 65–99)
HCT VFR BLD AUTO: 40.2 % (ref 34–46.6)
HGB BLD-MCNC: 14 G/DL (ref 12–15.9)
HOLD SPECIMEN: NORMAL
HOLD SPECIMEN: NORMAL
LYMPHOCYTES # BLD AUTO: 2.3 10*3/MM3 (ref 0.7–3.1)
LYMPHOCYTES NFR BLD AUTO: 33.1 % (ref 19.6–45.3)
MCH RBC QN AUTO: 35.2 PG (ref 26.6–33)
MCHC RBC AUTO-ENTMCNC: 34.8 G/DL (ref 31.5–35.7)
MCV RBC AUTO: 101.1 FL (ref 79–97)
MONOCYTES # BLD AUTO: 0.5 10*3/MM3 (ref 0.1–0.9)
MONOCYTES NFR BLD AUTO: 7.7 % (ref 5–12)
NEUTROPHILS NFR BLD AUTO: 4 10*3/MM3 (ref 1.7–7)
NEUTROPHILS NFR BLD AUTO: 57.5 % (ref 42.7–76)
NRBC BLD AUTO-RTO: 0.1 /100 WBC (ref 0–0.2)
NT-PROBNP SERPL-MCNC: 30.1 PG/ML (ref 0–450)
PLATELET # BLD AUTO: 282 10*3/MM3 (ref 140–450)
PMV BLD AUTO: 7.2 FL (ref 6–12)
POTASSIUM SERPL-SCNC: 3.5 MMOL/L (ref 3.5–5.2)
PROT SERPL-MCNC: 6.7 G/DL (ref 6–8.5)
RBC # BLD AUTO: 3.98 10*6/MM3 (ref 3.77–5.28)
SARS-COV-2 RNA PNL SPEC NAA+PROBE: NOT DETECTED
SODIUM SERPL-SCNC: 134 MMOL/L (ref 136–145)
TROPONIN T SERPL-MCNC: <0.01 NG/ML (ref 0–0.03)
TROPONIN T SERPL-MCNC: <0.01 NG/ML (ref 0–0.03)
WBC # BLD AUTO: 7 10*3/MM3 (ref 3.4–10.8)
WHOLE BLOOD HOLD SPECIMEN: NORMAL
WHOLE BLOOD HOLD SPECIMEN: NORMAL

## 2021-09-20 PROCEDURE — 84484 ASSAY OF TROPONIN QUANT: CPT | Performed by: PHYSICIAN ASSISTANT

## 2021-09-20 PROCEDURE — C9803 HOPD COVID-19 SPEC COLLECT: HCPCS

## 2021-09-20 PROCEDURE — 96374 THER/PROPH/DIAG INJ IV PUSH: CPT

## 2021-09-20 PROCEDURE — 93005 ELECTROCARDIOGRAM TRACING: CPT | Performed by: EMERGENCY MEDICINE

## 2021-09-20 PROCEDURE — 71045 X-RAY EXAM CHEST 1 VIEW: CPT

## 2021-09-20 PROCEDURE — G0378 HOSPITAL OBSERVATION PER HR: HCPCS

## 2021-09-20 PROCEDURE — 85025 COMPLETE CBC W/AUTO DIFF WBC: CPT | Performed by: PHYSICIAN ASSISTANT

## 2021-09-20 PROCEDURE — 83880 ASSAY OF NATRIURETIC PEPTIDE: CPT | Performed by: PHYSICIAN ASSISTANT

## 2021-09-20 PROCEDURE — 87635 SARS-COV-2 COVID-19 AMP PRB: CPT | Performed by: EMERGENCY MEDICINE

## 2021-09-20 PROCEDURE — 99284 EMERGENCY DEPT VISIT MOD MDM: CPT

## 2021-09-20 PROCEDURE — 93005 ELECTROCARDIOGRAM TRACING: CPT

## 2021-09-20 PROCEDURE — 80053 COMPREHEN METABOLIC PANEL: CPT | Performed by: PHYSICIAN ASSISTANT

## 2021-09-20 PROCEDURE — 25010000002 LORAZEPAM PER 2 MG: Performed by: PHYSICIAN ASSISTANT

## 2021-09-20 PROCEDURE — 85379 FIBRIN DEGRADATION QUANT: CPT | Performed by: PHYSICIAN ASSISTANT

## 2021-09-20 RX ORDER — ASPIRIN 81 MG/1
324 TABLET, CHEWABLE ORAL ONCE
Status: COMPLETED | OUTPATIENT
Start: 2021-09-20 | End: 2021-09-20

## 2021-09-20 RX ORDER — NITROGLYCERIN 0.4 MG/1
0.4 TABLET SUBLINGUAL
Status: DISCONTINUED | OUTPATIENT
Start: 2021-09-20 | End: 2021-09-20 | Stop reason: HOSPADM

## 2021-09-20 RX ORDER — SODIUM CHLORIDE 0.9 % (FLUSH) 0.9 %
10 SYRINGE (ML) INJECTION AS NEEDED
Status: DISCONTINUED | OUTPATIENT
Start: 2021-09-20 | End: 2021-09-20 | Stop reason: HOSPADM

## 2021-09-20 RX ORDER — LORAZEPAM 2 MG/ML
0.5 INJECTION INTRAMUSCULAR ONCE
Status: COMPLETED | OUTPATIENT
Start: 2021-09-20 | End: 2021-09-20

## 2021-09-20 RX ADMIN — NICOTINE 1 PATCH: 7 PATCH, EXTENDED RELEASE TRANSDERMAL at 14:47

## 2021-09-20 RX ADMIN — LORAZEPAM 0.5 MG: 2 INJECTION INTRAMUSCULAR; INTRAVENOUS at 14:34

## 2021-09-20 RX ADMIN — NITROGLYCERIN 0.4 MG: 0.4 TABLET SUBLINGUAL at 13:03

## 2021-09-20 RX ADMIN — ASPIRIN 81 MG CHEWABLE TABLET 324 MG: 81 TABLET CHEWABLE at 13:03

## 2021-09-20 NOTE — NURSING NOTE
Answered patient's call light, pt tearful and reports that she wants to sign out against medical advice. Encouraged patient to stay for further testing. Pt refused and signed out against medical advice. Provider aware of situation.

## 2021-09-20 NOTE — ED PROVIDER NOTES
Subjective   Patient is a 31-year-old female with a PMH of anxiety and depression who presents today with a chief complaint of chest pain since 4 AM this morning.  Patient states that at 4 AM she started pain constant chest pain down the middle of her chest which radiates into her back between her shoulder blades and down both her arms which she states is stabbing and 10/10.  Patient states she took Tylenol at home with no relief.  She states that nothing makes her pain worse or better.  Also admits to constant shortness of breath due to the pain, nausea with no vomiting, palpitations, dizziness and lightheadedness with no loss of consciousness or syncopal episode, and numbness and tingling intermittently in her right arm and hand.  Patient denies any vomiting edema of her lower extremities, abdominal pain, upper respiratory symptoms, vision change, fevers, cough, or weakness in her extremities.  Patient states that a year ago she had similar symptoms and was not given a diagnosis and did not follow-up with anybody in regards to her symptoms at that time, however the patient states that times today are worse in severity.  Patient also states that her brother and father passed away due to a MI around the age of 39.  She states that the only medication she is currently on is estradiol. Patient is a current every day smoker. Reports occasional alcohol use denies any illicit drug use.          Review of Systems   Constitutional: Negative for chills, diaphoresis, fatigue and fever.   HENT: Negative.    Eyes: Negative.    Respiratory: Positive for shortness of breath. Negative for cough and wheezing.    Cardiovascular: Positive for chest pain and palpitations. Negative for leg swelling.   Gastrointestinal: Negative for abdominal distention, abdominal pain, nausea and vomiting.   Musculoskeletal: Positive for back pain.   Neurological: Positive for dizziness and light-headedness. Negative for weakness.       Past Medical  History:   Diagnosis Date   • Anxiety    • Depression        Allergies   Allergen Reactions   • Tramadol Shortness Of Breath   • Tramadol Hcl Unknown (See Comments)   • Acetaminophen-Codeine Unknown (See Comments)   • Other Unknown (See Comments)     Muscle relaxers       Past Surgical History:   Procedure Laterality Date   • CHOLECYSTECTOMY     • DILATATION AND CURETTAGE     • HYSTERECTOMY     • INNER EAR SURGERY     • TUBAL ABDOMINAL LIGATION         History reviewed. No pertinent family history.    Social History     Socioeconomic History   • Marital status:      Spouse name: Not on file   • Number of children: Not on file   • Years of education: Not on file   • Highest education level: Not on file   Tobacco Use   • Smoking status: Current Every Day Smoker     Packs/day: 1.00   Substance and Sexual Activity   • Alcohol use: Yes   • Drug use: Never   • Sexual activity: Yes     Partners: Male           Objective   Physical Exam  Vitals and nursing note reviewed.   Constitutional:       General: She is not in acute distress.     Appearance: She is well-developed. She is not ill-appearing, toxic-appearing or diaphoretic.   HENT:      Head: Normocephalic and atraumatic.      Mouth/Throat:      Mouth: Mucous membranes are moist.      Pharynx: Oropharynx is clear.   Eyes:      General: No scleral icterus.     Extraocular Movements: Extraocular movements intact.      Pupils: Pupils are equal, round, and reactive to light.   Cardiovascular:      Rate and Rhythm: Normal rate and regular rhythm.      Heart sounds: No murmur heard.   No friction rub. No gallop.    Pulmonary:      Effort: Pulmonary effort is normal. No respiratory distress.      Breath sounds: Normal breath sounds. No stridor. No wheezing, rhonchi or rales.   Chest:      Chest wall: Tenderness present. No lacerations, deformity, swelling or crepitus.   Abdominal:      General: Bowel sounds are normal. There is no distension. There are no signs of  "injury.      Palpations: Abdomen is soft.      Tenderness: There is no abdominal tenderness. There is no right CVA tenderness, left CVA tenderness, guarding or rebound. Negative signs include Mares's sign and McBurney's sign.      Hernia: No hernia is present.   Musculoskeletal:      Cervical back: Normal range of motion and neck supple. No rigidity.      Right lower leg: No edema.      Left lower leg: No edema.   Skin:     General: Skin is warm.      Capillary Refill: Capillary refill takes less than 2 seconds.      Coloration: Skin is not cyanotic, jaundiced or pale.      Findings: No rash.   Neurological:      General: No focal deficit present.      Mental Status: She is alert and oriented to person, place, and time.   Psychiatric:         Mood and Affect: Mood normal.         Behavior: Behavior normal.         Procedures           ED Course      /58   Pulse 74   Temp 97.1 °F (36.2 °C) (Temporal)   Resp 16   Ht 154.9 cm (61\")   Wt 43.1 kg (95 lb)   SpO2 100%   BMI 17.95 kg/m²      /58   Pulse 74   Temp 97.1 °F (36.2 °C) (Temporal)   Resp 16   Ht 154.9 cm (61\")   Wt 43.1 kg (95 lb)   SpO2 100%   BMI 17.95 kg/m²   Medications   sodium chloride 0.9 % flush 10 mL (has no administration in time range)   nitroglycerin (NITROSTAT) SL tablet 0.4 mg (0.4 mg Sublingual Given 9/20/21 1303)   nicotine (NICODERM CQ) 7 MG/24HR patch 1 patch (has no administration in time range)   aspirin chewable tablet 324 mg (324 mg Oral Given 9/20/21 1303)   LORazepam (ATIVAN) injection 0.5 mg (0.5 mg Intravenous Given 9/20/21 1434)     Labs Reviewed   COMPREHENSIVE METABOLIC PANEL - Abnormal; Notable for the following components:       Result Value    Glucose 102 (*)     Sodium 134 (*)     All other components within normal limits    Narrative:     GFR Normal >60  Chronic Kidney Disease <60  Kidney Failure <15     CBC WITH AUTO DIFFERENTIAL - Abnormal; Notable for the following components:    .1 (*)     " MCH 35.2 (*)     All other components within normal limits   TROPONIN (IN-HOUSE) - Normal    Narrative:     Troponin T Reference Range:  <= 0.03 ng/mL-   Negative for AMI  >0.03 ng/mL-     Abnormal for myocardial necrosis.  Clinicians would have to utilize clinical acumen, EKG, Troponin and serial changes to determine if it is an Acute Myocardial Infarction or myocardial injury due to an underlying chronic condition.       Results may be falsely decreased if patient taking Biotin.     D-DIMER, QUANTITATIVE - Normal    Narrative:     Reference Range  --------------------------------------------------------------------     < 0.50   Negative Predictive Value  0.50-0.59   Indeterminate    >= 0.60   Probable VTE             A very low percentage of patients with DVT may yield D-Dimer results   below the cut-off of 0.50 mg/L FEU.  This is known to be more   prevalent in patients with distal DVT.             Results of this test should always be interpreted in conjunction with   the patient's medical history, clinical presentation and other   findings.  Clinical diagnosis should not be based on the result of   [place] D-Dimer alone.   BNP (IN-HOUSE) - Normal    Narrative:     Among patients with dyspnea, NT-proBNP is highly sensitive for the detection of acute congestive heart failure. In addition NT-proBNP of <300 pg/ml effectively rules out acute congestive heart failure with 99% negative predictive value.    Results may be falsely decreased if patient taking Biotin.     RAINBOW DRAW    Narrative:     The following orders were created for panel order [place] Draw.  Procedure                               Abnormality         Status                     ---------                               -----------         ------                     Green Top (Gel)[[id number]]                                  Final result               Lavender Top[[id number]]                                     Final result               Gold Top -  SST[[id number]]                                   Final result               Light Blue Top[[id number]]                                   Final result                 Please view results for these tests on the individual orders.   TROPONIN (IN-HOUSE)   CBC AND DIFFERENTIAL    Narrative:     The following orders were created for panel order CBC & Differential.  Procedure                               Abnormality         Status                     ---------                               -----------         ------                     CBC Auto Differential[[id number]]        Abnormal            Final result                 Please view results for these tests on the individual orders.   GREEN TOP   LAVENDER TOP   GOLD TOP - SST   LIGHT BLUE TOP     XR Chest 1 View    Result Date: 9/20/2021  1.An acute pulmonary process is not apparent.  Electronically Signed By-Liam Mann MD On:9/20/2021 1:15 PM This report was finalized on [id number] by  Liam Mann MD.                 HEART Score: 2                      MDM  Number of Diagnoses or Management Options  Diagnosis management comments: Chart Review:  Comorbidity: As per past medical history  ECG: Interpreted by myself and Dr. Trevino shows sinus rhythm rate 77 normal EKG previous reviewed from 10/20/2020  Labs: As above  Imaging: Was interpreted by physician and reviewed by myself:  XR Chest 1 View  Result Date: 9/20/2021  1.An acute pulmonary process is not apparent.  Electronically Signed By-Liam Mann MD On:9/20/2021 1:15 PM This report was finalized on [id number] by  Liam Mann MD.    Disposition/Treatment:  Appropriate PPE was worn during exam and throughout all encounters with the patient. While in the ED patient was afebrile and appeared nontoxic she was placed on proper monitors. Patient did remain somewhat anxious throughout her ED stay patient was initially given aspirin and nitro with minimal relief of her pain. Patient was given some additional 0.5 mg  of Ativan. EKG showed sinus rhythm no signs of acute STEMI. Troponin BNP D-dimer all within normal limits. CBC and CMP were fairly unremarkable no signs of severe infection or electrolyte abnormality. Chest x-ray showed no acute cardiopulmonary abnormality. Patient's chest pain was reproducible with palpation however patient does have a strong family history with her brother passing away in his 40s from an MI recently. Patient was offered admission in the ED observation unit versus discharge however she currently has not established with a primary care patient was in agreement for admission to observation unit for serial troponins and stress test tomorrow. Spoke to Fatmata FULLER who agreed for admission to the observation unit. Patient remained stable throughout her ED stay.       Amount and/or Complexity of Data Reviewed  Clinical lab tests: reviewed  Tests in the radiology section of CPT®: reviewed  Tests in the medicine section of CPT®: reviewed        Final diagnoses:   Chest pain, unspecified type       ED Disposition  ED Disposition     ED Disposition Condition Comment    Decision to Admit            No follow-up provider specified.       Medication List      No changes were made to your prescriptions during this visit.          Oral Galeana PA  09/20/21 7581

## 2021-09-21 NOTE — CASE MANAGEMENT/SOCIAL WORK
Case Management Discharge Note                Selected Continued Care - Discharged on 9/20/2021 Admission date: 9/20/2021 - Discharge disposition: Left Against Medical Advice                                   Final Discharge Disposition Code: 07 - left AMA

## 2021-09-22 LAB — QT INTERVAL: 379 MS

## 2021-09-24 ENCOUNTER — OFFICE VISIT (OUTPATIENT)
Dept: CARDIOLOGY | Facility: CLINIC | Age: 31
End: 2021-09-24

## 2021-09-24 VITALS
HEIGHT: 61 IN | OXYGEN SATURATION: 100 % | BODY MASS INDEX: 15.06 KG/M2 | DIASTOLIC BLOOD PRESSURE: 83 MMHG | SYSTOLIC BLOOD PRESSURE: 112 MMHG | WEIGHT: 79.75 LBS | HEART RATE: 102 BPM

## 2021-09-24 DIAGNOSIS — G89.29 CHRONIC CHEST PAIN WITH HIGH RISK FOR CAD: Primary | ICD-10-CM

## 2021-09-24 DIAGNOSIS — Z91.89 CHRONIC CHEST PAIN WITH HIGH RISK FOR CAD: Primary | ICD-10-CM

## 2021-09-24 DIAGNOSIS — R07.9 CHRONIC CHEST PAIN WITH HIGH RISK FOR CAD: Primary | ICD-10-CM

## 2021-09-24 PROCEDURE — 99203 OFFICE O/P NEW LOW 30 MIN: CPT | Performed by: INTERNAL MEDICINE

## 2021-09-24 RX ORDER — ESTRADIOL 2 MG/1
1 TABLET ORAL 3 TIMES DAILY
COMMUNITY
Start: 2021-09-17

## 2021-09-24 RX ORDER — ASPIRIN 81 MG/1
81 TABLET ORAL DAILY
COMMUNITY
End: 2023-01-31

## 2021-09-24 NOTE — PROGRESS NOTES
HP      Name: Abby Glover ADMIT: (Not on file)   : 1990  PCP: Provider, No Known    MRN: [id number] LOS: 0 days   AGE/SEX: 31 y.o. female  ROOM: Room/bed info not found     Chief Complaint   Patient presents with   • Consult     Hospital f/u       Subjective         History of present illness  Ms. Glover is a 31-year-old female patient who has no prior history of coronary artery disease however she does have a strong family history of it, her father  suddenly at the age of 40 so did her brother who passed away in his sleep and he was also 40.  Blood deaths were certified as myocardial infarction.  Patient presented to the ER on Monday with complaints of chest pain she was ruled out for myocardial infarction and she was advised to stay in the hospital for stress test but she opted to leave AGAINST MEDICAL ADVICE.  She thinks that her stress test could have been the result of her anxiety.  Patient does have chest pain on occasion often felt as sharp pains in her chest with some radiation to the back with no triggering factors but she also has severe anxiety certainly now, since her brother just passed away.    Past Medical History:   Diagnosis Date   • Anxiety    • Depression      Past Surgical History:   Procedure Laterality Date   • CHOLECYSTECTOMY     • DILATATION AND CURETTAGE     • HYSTERECTOMY     • INNER EAR SURGERY     • TUBAL ABDOMINAL LIGATION       Family History   Problem Relation Age of Onset   • Heart disease Father    • Heart attack Father    • Heart attack Brother    • Heart disease Brother      Social History     Tobacco Use   • Smoking status: Current Every Day Smoker     Packs/day: 1.00   • Smokeless tobacco: Never Used   Vaping Use   • Vaping Use: Former   • Substances: Nicotine   • Devices: Disposable   Substance Use Topics   • Alcohol use: Yes   • Drug use: Never     (Not in a hospital admission)    Allergies:  Tramadol, Tramadol hcl, Acetaminophen-codeine, and  Other        Physical Exam  VITALS REVIEWED    General:      well developed, in no acute distress.    Head:      normocephalic and atraumatic.    Eyes:      PERRL/EOM intact, conjunctiva and sclera clear with out nystagmus.    Neck:      no masses, thyromegaly,  trachea central with normal respiratory effort and PMI displaced laterally  Lungs:      Clear to auscultation bilaterally  Heart:       Regular rate and rhythm, no murmur rubs or gallops  Msk:      no deformity or scoliosis noted of thoracic or lumbar spine.    Pulses:      pulses normal in all 4 extremities.    Extremities:       No lower extremity edema  Neurologic:      no focal deficits.   alert oriented x3  Skin:      intact without lesions or rashes.    Psych:      alert and cooperative; normal mood and affect; normal attention span and concentration.      Result Review :                Pertinent cardiac workup    EKG on 9/20/2021 shows sinus rhythm, no QT prolongation, no WPW no Brugada pattern.  I personally reviewed this EKG    Procedures        Assessment and Plan      Ms. Glover is a 31-year-old female patient who has been experiencing chest pain for which she went to the ER earlier this week, she also has strong family history of presumed coronary artery disease both her brother and father passed away in their 40s.  Cardiac arrhythmias could also be a cause of sudden cardiac death but her EKG does not exhibit long QT syndrome or Brugada pattern.  Patient is a long-term smoker.  I strongly advised on smoking cessation during this visit but will also go ahead and get a stress test for risk stratification purposes as well as ongoing chest pain.  I will see her in the clinic after completion of the test.    Diagnoses and all orders for this visit:    1. Chronic chest pain with high risk for CAD (Primary)  -     Stress Test With Myocardial Perfusion One Day; Future           Return in about 2 weeks (around 10/8/2021).  Patient was given instructions  and counseling regarding her condition or for health maintenance advice. Please see specific information pulled into the AVS if appropriate.

## 2021-09-30 DIAGNOSIS — R07.89 CHEST PAIN, ATYPICAL: Primary | ICD-10-CM

## 2021-10-01 ENCOUNTER — APPOINTMENT (OUTPATIENT)
Dept: CARDIOLOGY | Facility: HOSPITAL | Age: 31
End: 2021-10-01

## 2021-10-07 DIAGNOSIS — Z76.89 ENCOUNTER TO ESTABLISH CARE WITH NEW DOCTOR: Primary | ICD-10-CM

## 2021-11-12 ENCOUNTER — HOSPITAL ENCOUNTER (OUTPATIENT)
Dept: CARDIOLOGY | Facility: HOSPITAL | Age: 31
Discharge: HOME | End: 2021-11-12
Admitting: NURSE PRACTITIONER

## 2021-11-12 DIAGNOSIS — R07.2 PRECORDIAL PAIN: Primary | ICD-10-CM

## 2021-11-12 DIAGNOSIS — R07.89 CHEST PAIN, ATYPICAL: ICD-10-CM

## 2021-11-12 DIAGNOSIS — R94.39 ABNORMAL STRESS TEST: ICD-10-CM

## 2021-11-12 LAB
BH CV STRESS BP STAGE 1: NORMAL
BH CV STRESS BP STAGE 2: NORMAL
BH CV STRESS DURATION MIN STAGE 1: 3
BH CV STRESS DURATION MIN STAGE 2: 3
BH CV STRESS DURATION SEC STAGE 1: 0
BH CV STRESS DURATION SEC STAGE 2: 0
BH CV STRESS GRADE STAGE 1: 10
BH CV STRESS GRADE STAGE 2: 12
BH CV STRESS HR STAGE 1: 138
BH CV STRESS HR STAGE 2: 149
BH CV STRESS METS STAGE 1: 5
BH CV STRESS METS STAGE 2: 7.5
BH CV STRESS PROTOCOL 1: NORMAL
BH CV STRESS RECOVERY BP: NORMAL MMHG
BH CV STRESS RECOVERY HR: 102 BPM
BH CV STRESS SPEED STAGE 1: 1.7
BH CV STRESS SPEED STAGE 2: 2.5
BH CV STRESS STAGE 1: 1
BH CV STRESS STAGE 2: 2
MAXIMAL PREDICTED HEART RATE: 188 BPM
PERCENT MAX PREDICTED HR: 86.17 %
STRESS BASELINE BP: NORMAL MMHG
STRESS BASELINE HR: 107 BPM
STRESS PERCENT HR: 101 %
STRESS POST EXERCISE DUR MIN: 7 MIN
STRESS POST EXERCISE DUR SEC: 30 SEC
STRESS POST PEAK HR: 162 BPM
STRESS TARGET HR: 160 BPM

## 2021-11-12 PROCEDURE — 93018 CV STRESS TEST I&R ONLY: CPT | Performed by: INTERNAL MEDICINE

## 2021-11-12 PROCEDURE — 93017 CV STRESS TEST TRACING ONLY: CPT

## 2021-11-29 ENCOUNTER — TELEPHONE (OUTPATIENT)
Dept: CARDIOLOGY | Facility: CLINIC | Age: 31
End: 2021-11-29

## 2021-12-06 ENCOUNTER — OFFICE VISIT (OUTPATIENT)
Dept: CARDIOLOGY | Facility: CLINIC | Age: 31
End: 2021-12-06

## 2021-12-06 VITALS
DIASTOLIC BLOOD PRESSURE: 77 MMHG | SYSTOLIC BLOOD PRESSURE: 108 MMHG | HEIGHT: 61 IN | BODY MASS INDEX: 16.05 KG/M2 | WEIGHT: 85 LBS | OXYGEN SATURATION: 98 % | HEART RATE: 88 BPM

## 2021-12-06 DIAGNOSIS — R07.2 PRECORDIAL PAIN: Primary | ICD-10-CM

## 2021-12-06 DIAGNOSIS — Z82.49 FAMILY HISTORY OF PREMATURE CAD: ICD-10-CM

## 2021-12-06 PROCEDURE — 99213 OFFICE O/P EST LOW 20 MIN: CPT | Performed by: INTERNAL MEDICINE

## 2021-12-06 NOTE — PROGRESS NOTES
Subjective:     Encounter Date:12/06/2021      Patient ID: Abby Glover is a 31 y.o. female.    Chief Complaint : Chest pain, smoker, CTF and stress test follow-up.  History of Present Illness      This is a 31-year-old with PMH of    Very strong family history of premature CAD father MI in 40s Brother had fatal MI at 40  Anxiety, depression cholecystectomy, hysterectomy, tubal ligation, D&C  Cigarette smoker  Tramadol, acetaminophen codeine    Here with complaint of chest pain had a treadmill stress test where she had chest pain on treadmill.  EKG was normal.  Therefore patient underwent coronary CTA done at [place], 11/22/2021 which revealed normal coronary CTA  Labs from 9/20/2021 revealed normal troponin and D-dimer and BNP CMP and CBC      Assessment :    Chest pain with exertion  Normal coronary CTA  Cigarette smoker  Positive family history of premature CAD      Recommendations and plan :    Reviewed coronary CTA results.  Patient had chest pain on treadmill either due to small vessel disease or microvascular disease.  Or due to reactive airway disease from smoking.  Reviewed risk factors for CAD counseled on smoking cessation  Follow-up with PMD to evaluate other etiologies for patient's chest pain  Call me back if I can be of any further assistance        Procedures CTA done in [facility] 11/22/2021 revealed no obstructive CAD.    The following portions of the patient's history were reviewed and updated as appropriate: allergies, current medications, past family history, past medical history, past social history, past surgical history and problem list.    Assessment:         [facility]     Diagnosis Plan   1. Precordial pain     2. Family history of premature CAD            Plan:               Past Medical History:  Past Medical History:   Diagnosis Date   • Anxiety    • Depression      Past Surgical History:  Past Surgical History:   Procedure Laterality Date   • CHOLECYSTECTOMY     •  "DILATATION AND CURETTAGE     • HYSTERECTOMY     • INNER EAR SURGERY     • TUBAL ABDOMINAL LIGATION        Allergies:  Allergies   Allergen Reactions   • Tramadol Shortness Of Breath   • Tramadol Hcl Unknown (See Comments)   • Acetaminophen-Codeine Unknown (See Comments)   • Other Unknown (See Comments)     Muscle relaxers     Home Meds:  Current Meds:     Current Outpatient Medications:   •  aspirin 81 MG EC tablet, Take 81 mg by mouth Daily., Disp: , Rfl:   •  estradiol (ESTRACE) 2 MG tablet, Take 1 tablet by mouth 3 (Three) Times a Day., Disp: , Rfl:   Social History:   Social History     Tobacco Use   • Smoking status: Current Every Day Smoker     Packs/day: 1.00   • Smokeless tobacco: Never Used   Substance Use Topics   • Alcohol use: Yes      Family History:  Family History   Problem Relation Age of Onset   • Heart disease Father    • Heart attack Father    • Heart attack Brother    • Heart disease Brother               Review of Systems   Constitutional: Negative for malaise/fatigue.   Cardiovascular: Positive for chest pain. Negative for leg swelling and palpitations.   Respiratory: Positive for shortness of breath.    Skin: Negative for rash.   Neurological: Negative for dizziness, light-headedness and numbness.     All other systems are negative         Objective:     Physical Exam  /77   Pulse 88   Ht 154.9 cm (61\")   Wt 38.6 kg (85 lb)   SpO2 98%   BMI 16.06 kg/m²   General:  Appears in no acute distress  Eyes: Sclera is anicteric,  conjunctiva is clear   HEENT:  No JVD.  No carotid bruits  Respiratory: Respirations regular and unlabored at rest.  Clear to auscultation  Cardiovascular: S1,S2 Regular rate and rhythm. No murmur, rub or gallop auscultated.   Extremities: No digital clubbing or cyanosis, no edema  Skin: Color pink. Skin warm and dry to touch. No rashes  No xanthoma  Neuro: Alert and awake, no lateralizing deficits appreciated    Lab Reviewed:         Tomas Negron, " MD  12/6/2021 12:46 EST      Much of the above report is an electronic transcription/translation of the spoken language to printed text using Dragon Software. As such, the subtleties and finesse of the spoken language may permit erroneous, or at times, nonsensical words or phrases to be inadvertently transcribed; thus changes may be made at a later date to rectify these errors.

## 2022-03-06 ENCOUNTER — HOSPITAL ENCOUNTER (EMERGENCY)
Facility: HOSPITAL | Age: 32
Discharge: HOME | End: 2022-03-06
Attending: EMERGENCY MEDICINE | Admitting: EMERGENCY MEDICINE

## 2022-03-06 VITALS
DIASTOLIC BLOOD PRESSURE: 64 MMHG | WEIGHT: 83.55 LBS | SYSTOLIC BLOOD PRESSURE: 92 MMHG | TEMPERATURE: 98.2 F | HEIGHT: 61 IN | HEART RATE: 58 BPM | OXYGEN SATURATION: 96 % | BODY MASS INDEX: 15.78 KG/M2 | RESPIRATION RATE: 18 BRPM

## 2022-03-06 DIAGNOSIS — Z20.822 COVID-19 RULED OUT BY LABORATORY TESTING: ICD-10-CM

## 2022-03-06 DIAGNOSIS — J06.9 VIRAL UPPER RESPIRATORY INFECTION: Primary | ICD-10-CM

## 2022-03-06 LAB
FLUAV SUBTYP SPEC NAA+PROBE: NOT DETECTED
FLUBV RNA ISLT QL NAA+PROBE: NOT DETECTED
SARS-COV-2 RNA PNL SPEC NAA+PROBE: NOT DETECTED

## 2022-03-06 PROCEDURE — 99283 EMERGENCY DEPT VISIT LOW MDM: CPT

## 2022-03-06 PROCEDURE — C9803 HOPD COVID-19 SPEC COLLECT: HCPCS

## 2022-03-06 PROCEDURE — 87635 SARS-COV-2 COVID-19 AMP PRB: CPT | Performed by: EMERGENCY MEDICINE

## 2022-03-06 PROCEDURE — 87502 INFLUENZA DNA AMP PROBE: CPT | Performed by: EMERGENCY MEDICINE

## 2022-03-06 RX ORDER — BROMPHENIRAMINE MALEATE, PSEUDOEPHEDRINE HYDROCHLORIDE, AND DEXTROMETHORPHAN HYDROBROMIDE 2; 30; 10 MG/5ML; MG/5ML; MG/5ML
5 SYRUP ORAL 4 TIMES DAILY PRN
Qty: 118 ML | Refills: 0 | Status: SHIPPED | OUTPATIENT
Start: 2022-03-06 | End: 2023-01-31

## 2022-03-06 NOTE — ED PROVIDER NOTES
Subjective   History of Present Illness  Cough and congestion  31-year-old female states she has had nasal congestion and cough and sore throat over the last 2 to 3 days.  She denies any known ill contacts.  She reports no fevers or chills.  He reports no vomiting or diarrhea or shortness of breath.  Review of Systems   Constitutional: Negative for fever.   HENT: Positive for congestion and sore throat.    Respiratory: Positive for cough. Negative for shortness of breath.    Cardiovascular: Negative.    Gastrointestinal: Negative.    Genitourinary: Negative.    Musculoskeletal: Negative.    Skin: Negative.    Neurological: Negative.    Psychiatric/Behavioral: Negative.        Past Medical History:   Diagnosis Date   • Anxiety    • Depression        Allergies   Allergen Reactions   • Tramadol Shortness Of Breath   • Tramadol Hcl Unknown (See Comments)   • Acetaminophen-Codeine Unknown (See Comments)   • Other Unknown (See Comments)     Muscle relaxers       Past Surgical History:   Procedure Laterality Date   • CHOLECYSTECTOMY     • DILATATION AND CURETTAGE     • HYSTERECTOMY     • INNER EAR SURGERY     • TUBAL ABDOMINAL LIGATION         Family History   Problem Relation Age of Onset   • Heart disease Father    • Heart attack Father    • Heart attack Brother    • Heart disease Brother        Social History     Socioeconomic History   • Marital status:    Tobacco Use   • Smoking status: Current Every Day Smoker     Packs/day: 1.00   • Smokeless tobacco: Never Used   Vaping Use   • Vaping Use: Former   • Substances: Nicotine   • Devices: Disposable   Substance and Sexual Activity   • Alcohol use: Yes   • Drug use: Never   • Sexual activity: Yes     Partners: Male       Prior to Admission medications    Medication Sig Start Date End Date Taking? Authorizing Provider   aspirin 81 MG EC tablet Take 81 mg by mouth Daily.    Provider, MD Miranda   estradiol (ESTRACE) 2 MG tablet Take 1 tablet by mouth 3 (Three)  "Times a Day. 9/17/21   Provider, MD Miranda     /72   Pulse 70   Temp 97.6 °F (36.4 °C) (Oral)   Resp 16   Ht 154.9 cm (61\")   Wt 37.9 kg (83 lb 8.9 oz)   SpO2 98%   BMI 15.79 kg/m²   I examined the patient using the appropriate personal protective equipment.        Objective   Physical Exam  General: Well-developed well-appearing, no acute distress, alert and appropriate  Eyes:  sclera nonicteric  HEENT: Mucous membranes moist, no mucosal swelling, some audible nasal congestion, nasal mucosa slightly edematous  Neck: Supple, no nuchal rigidity, no lymphadenopathy  Respirations: Respirations nonlabored, equal breath sounds bilaterally, clear lungs  Heart regular rate and rhythm, no murmurs rubs or gallops,   Abdomen soft nontender nondistended,   Extremities no clubbing cyanosis or edema, calves are symmetric and nontender  Neuro cranial nerves grossly intact, no focal limb deficits  Psych oriented, pleasant affect  Skin no rash, brisk cap refill  Procedures           ED Course            Results for orders placed or performed during the hospital encounter of 03/06/22   COVID-19,CEPHEID/BHAVESH,COR/EJN/PAD/CARMEN IN-HOUSE(OR EMERGENT/ADD-ON),NP SWAB IN TRANSPORT MEDIA 3-4 HR TAT, RT-PCR - Swab, Nasopharynx    Specimen: Nasopharynx; Swab   Result Value Ref Range    COVID19 Not Detected Not Detected - Ref. Range   Influenza Antigen, Rapid - Swab, Nasopharynx    Specimen: Nasopharynx; Swab   Result Value Ref Range    Influenza A PCR Not Detected Not Detected    Influenza B PCR Not Detected Not Detected                                             MDM  Patient presents with symptoms of viral upper respiratory infection.  Allergic rhinitis also in differential.  Her Covid screen and influenza screen negative.  She is well-appearing she is not septic she is in no respiratory distress she had no signs of meningitis.  She was advised the findings she is prescribed Bromfed-DM discharged good condition was given " warning signs for return.  Final diagnoses:   Viral upper respiratory infection   COVID-19 ruled out by laboratory testing       ED Disposition  ED Disposition     ED Disposition   Discharge    Condition   Stable    Comment   --             PATIENT CONNECTION - [facility] [id number] [phone]  Schedule an appointment as soon as possible for a visit in 1 week           Medication List      New Prescriptions    brompheniramine-pseudoephedrine-DM 30-2-10 MG/5ML syrup  Take 5 mL by mouth 4 (Four) Times a Day As Needed for Congestion, Cough or Allergies.           Where to Get Your Medications      These medications were sent to [facility] [place] - [address] - [phone]  - [phone] FX  [address] [place] [address]    Phone: [phone]   · brompheniramine-pseudoephedrine-DM 30-2-10 MG/5ML syrup          Rei Monroy MD  03/06/22 [id number]

## 2022-03-06 NOTE — DISCHARGE INSTRUCTIONS
Rest, drink plenty of fluids, throat lozenges, start Bromfed as needed for cough and congestion.  Return for increased shortness of breath, high fever or any other concerns

## 2022-09-21 ENCOUNTER — APPOINTMENT (OUTPATIENT)
Dept: GENERAL RADIOLOGY | Facility: HOSPITAL | Age: 32
End: 2022-09-21

## 2022-09-21 ENCOUNTER — HOSPITAL ENCOUNTER (EMERGENCY)
Facility: HOSPITAL | Age: 32
Discharge: HOME | End: 2022-09-21
Attending: EMERGENCY MEDICINE | Admitting: EMERGENCY MEDICINE

## 2022-09-21 VITALS
WEIGHT: 82.89 LBS | RESPIRATION RATE: 17 BRPM | DIASTOLIC BLOOD PRESSURE: 64 MMHG | TEMPERATURE: 97.8 F | OXYGEN SATURATION: 98 % | SYSTOLIC BLOOD PRESSURE: 95 MMHG | HEART RATE: 53 BPM | BODY MASS INDEX: 16.27 KG/M2 | HEIGHT: 60 IN

## 2022-09-21 DIAGNOSIS — S32.10XA CLOSED FRACTURE OF SACRUM, UNSPECIFIED FRACTURE MORPHOLOGY, INITIAL ENCOUNTER: Primary | ICD-10-CM

## 2022-09-21 PROCEDURE — 72170 X-RAY EXAM OF PELVIS: CPT

## 2022-09-21 PROCEDURE — 72110 X-RAY EXAM L-2 SPINE 4/>VWS: CPT

## 2022-09-21 PROCEDURE — 72220 X-RAY EXAM SACRUM TAILBONE: CPT

## 2022-09-21 PROCEDURE — 99283 EMERGENCY DEPT VISIT LOW MDM: CPT

## 2022-09-21 RX ORDER — HYDROCODONE BITARTRATE AND ACETAMINOPHEN 5; 325 MG/1; MG/1
1 TABLET ORAL EVERY 6 HOURS PRN
Qty: 12 TABLET | Refills: 0 | Status: SHIPPED | OUTPATIENT
Start: 2022-09-21 | End: 2022-10-26 | Stop reason: SDUPTHER

## 2022-09-21 RX ORDER — IBUPROFEN 800 MG/1
800 TABLET ORAL EVERY 6 HOURS PRN
COMMUNITY
End: 2022-10-26 | Stop reason: SDUPTHER

## 2022-09-21 RX ORDER — HYDROCODONE BITARTRATE AND ACETAMINOPHEN 5; 325 MG/1; MG/1
1 TABLET ORAL ONCE AS NEEDED
Status: COMPLETED | OUTPATIENT
Start: 2022-09-21 | End: 2022-09-21

## 2022-09-21 RX ORDER — ACETAMINOPHEN 500 MG
1000 TABLET ORAL EVERY 6 HOURS PRN
COMMUNITY
End: 2023-01-05

## 2022-09-21 RX ADMIN — HYDROCODONE BITARTRATE AND ACETAMINOPHEN 1 TABLET: 5; 325 TABLET ORAL at 09:42

## 2022-09-21 NOTE — DISCHARGE INSTRUCTIONS
Use a donut to sit on, use a stool softener or laxative to prevent constipation with pain medication.  May continue to use ibuprofen for pain.  Return new or worsening symptoms.

## 2022-10-26 ENCOUNTER — OFFICE VISIT (OUTPATIENT)
Dept: FAMILY MEDICINE CLINIC | Facility: CLINIC | Age: 32
End: 2022-10-26

## 2022-10-26 VITALS
OXYGEN SATURATION: 97 % | BODY MASS INDEX: 17.4 KG/M2 | HEART RATE: 87 BPM | DIASTOLIC BLOOD PRESSURE: 72 MMHG | HEIGHT: 60 IN | SYSTOLIC BLOOD PRESSURE: 102 MMHG | WEIGHT: 88.6 LBS

## 2022-10-26 DIAGNOSIS — S32.10XA CLOSED FRACTURE OF SACRUM, UNSPECIFIED FRACTURE MORPHOLOGY, INITIAL ENCOUNTER: ICD-10-CM

## 2022-10-26 DIAGNOSIS — M41.26 OTHER IDIOPATHIC SCOLIOSIS, LUMBAR REGION: Primary | ICD-10-CM

## 2022-10-26 DIAGNOSIS — R63.6 UNDERWEIGHT: ICD-10-CM

## 2022-10-26 PROCEDURE — 99203 OFFICE O/P NEW LOW 30 MIN: CPT | Performed by: NURSE PRACTITIONER

## 2022-10-26 RX ORDER — IBUPROFEN 800 MG/1
800 TABLET ORAL EVERY 6 HOURS PRN
Qty: 90 TABLET | Refills: 2 | Status: SHIPPED | OUTPATIENT
Start: 2022-10-26 | End: 2022-12-01 | Stop reason: SDUPTHER

## 2022-10-26 RX ORDER — HYDROCODONE BITARTRATE AND ACETAMINOPHEN 5; 325 MG/1; MG/1
1 TABLET ORAL EVERY 6 HOURS PRN
Qty: 30 TABLET | Refills: 0 | Status: SHIPPED | OUTPATIENT
Start: 2022-10-26 | End: 2022-12-01 | Stop reason: SDUPTHER
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
## 2022-11-25 ENCOUNTER — HOSPITAL ENCOUNTER (OUTPATIENT)
Dept: MRI IMAGING | Facility: HOSPITAL | Age: 32
Discharge: HOME | End: 2022-11-25
Admitting: NURSE PRACTITIONER

## 2022-11-25 DIAGNOSIS — S32.10XA CLOSED FRACTURE OF SACRUM, UNSPECIFIED FRACTURE MORPHOLOGY, INITIAL ENCOUNTER: ICD-10-CM

## 2022-11-25 DIAGNOSIS — M41.26 OTHER IDIOPATHIC SCOLIOSIS, LUMBAR REGION: ICD-10-CM

## 2022-11-25 PROCEDURE — 72148 MRI LUMBAR SPINE W/O DYE: CPT

## 2022-12-01 ENCOUNTER — OFFICE VISIT (OUTPATIENT)
Dept: FAMILY MEDICINE CLINIC | Facility: CLINIC | Age: 32
End: 2022-12-01

## 2022-12-01 VITALS
BODY MASS INDEX: 17.04 KG/M2 | HEIGHT: 60 IN | HEART RATE: 78 BPM | OXYGEN SATURATION: 100 % | SYSTOLIC BLOOD PRESSURE: 112 MMHG | TEMPERATURE: 98 F | DIASTOLIC BLOOD PRESSURE: 82 MMHG | WEIGHT: 86.8 LBS

## 2022-12-01 DIAGNOSIS — S32.10XA CLOSED FRACTURE OF SACRUM, UNSPECIFIED FRACTURE MORPHOLOGY, INITIAL ENCOUNTER: ICD-10-CM

## 2022-12-01 DIAGNOSIS — M41.26 OTHER IDIOPATHIC SCOLIOSIS, LUMBAR REGION: Primary | ICD-10-CM

## 2022-12-01 PROCEDURE — 99214 OFFICE O/P EST MOD 30 MIN: CPT | Performed by: NURSE PRACTITIONER

## 2022-12-01 RX ORDER — HYDROCODONE BITARTRATE AND ACETAMINOPHEN 5; 325 MG/1; MG/1
1 TABLET ORAL EVERY 6 HOURS PRN
Qty: 30 TABLET | Refills: 0 | Status: SHIPPED | OUTPATIENT
Start: 2022-12-01 | End: 2023-01-31

## 2022-12-01 RX ORDER — LIDOCAINE HYDROCHLORIDE 20 MG/ML
SOLUTION OROPHARYNGEAL
COMMUNITY
Start: 2022-11-04 | End: 2023-01-31

## 2022-12-01 RX ORDER — IBUPROFEN 800 MG/1
800 TABLET ORAL EVERY 6 HOURS PRN
Qty: 90 TABLET | Refills: 2 | Status: SHIPPED | OUTPATIENT
Start: 2022-12-01 | End: 2023-01-18
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
## 2022-12-08 ENCOUNTER — OFFICE VISIT (OUTPATIENT)
Dept: PAIN MEDICINE | Facility: CLINIC | Age: 32
End: 2022-12-08

## 2022-12-08 VITALS
WEIGHT: 86 LBS | SYSTOLIC BLOOD PRESSURE: 115 MMHG | DIASTOLIC BLOOD PRESSURE: 74 MMHG | HEIGHT: 60 IN | HEART RATE: 90 BPM | BODY MASS INDEX: 16.88 KG/M2 | RESPIRATION RATE: 16 BRPM | OXYGEN SATURATION: 97 %

## 2022-12-08 DIAGNOSIS — S32.10XD CLOSED FRACTURE OF SACRUM AND COCCYX WITH ROUTINE HEALING, SUBSEQUENT ENCOUNTER: ICD-10-CM

## 2022-12-08 DIAGNOSIS — M47.816 LUMBAR SPONDYLOSIS: Primary | ICD-10-CM

## 2022-12-08 DIAGNOSIS — S32.2XXD CLOSED FRACTURE OF SACRUM AND COCCYX WITH ROUTINE HEALING, SUBSEQUENT ENCOUNTER: ICD-10-CM

## 2022-12-08 PROCEDURE — 99204 OFFICE O/P NEW MOD 45 MIN: CPT | Performed by: STUDENT IN AN ORGANIZED HEALTH CARE EDUCATION/TRAINING PROGRAM

## 2022-12-30 ENCOUNTER — HOSPITAL ENCOUNTER (OUTPATIENT)
Dept: PAIN MEDICINE | Facility: HOSPITAL | Age: 32
Discharge: HOME | End: 2022-12-30

## 2022-12-30 VITALS
TEMPERATURE: 97.8 F | WEIGHT: 86 LBS | RESPIRATION RATE: 16 BRPM | HEART RATE: 105 BPM | BODY MASS INDEX: 16.88 KG/M2 | SYSTOLIC BLOOD PRESSURE: 136 MMHG | HEIGHT: 60 IN | OXYGEN SATURATION: 100 % | DIASTOLIC BLOOD PRESSURE: 88 MMHG

## 2022-12-30 DIAGNOSIS — R52 PAIN: ICD-10-CM

## 2022-12-30 DIAGNOSIS — M47.816 LUMBAR SPONDYLOSIS: Primary | ICD-10-CM

## 2022-12-30 PROCEDURE — 25010000002 METHYLPREDNISOLONE PER 80 MG: Performed by: STUDENT IN AN ORGANIZED HEALTH CARE EDUCATION/TRAINING PROGRAM

## 2022-12-30 PROCEDURE — 77003 FLUOROGUIDE FOR SPINE INJECT: CPT

## 2022-12-30 PROCEDURE — 64494 INJ PARAVERT F JNT L/S 2 LEV: CPT | Performed by: STUDENT IN AN ORGANIZED HEALTH CARE EDUCATION/TRAINING PROGRAM

## 2022-12-30 PROCEDURE — 64493 INJ PARAVERT F JNT L/S 1 LEV: CPT | Performed by: STUDENT IN AN ORGANIZED HEALTH CARE EDUCATION/TRAINING PROGRAM

## 2022-12-30 RX ORDER — BUPIVACAINE HYDROCHLORIDE 2.5 MG/ML
10 INJECTION, SOLUTION EPIDURAL; INFILTRATION; INTRACAUDAL ONCE
Status: COMPLETED | OUTPATIENT
Start: 2022-12-30 | End: 2022-12-30

## 2022-12-30 RX ORDER — LIDOCAINE HYDROCHLORIDE 10 MG/ML
5 INJECTION, SOLUTION EPIDURAL; INFILTRATION; INTRACAUDAL; PERINEURAL ONCE
Status: COMPLETED | OUTPATIENT
Start: 2022-12-30 | End: 2022-12-30

## 2022-12-30 RX ORDER — METHYLPREDNISOLONE ACETATE 80 MG/ML
80 INJECTION, SUSPENSION INTRA-ARTICULAR; INTRALESIONAL; INTRAMUSCULAR; SOFT TISSUE ONCE
Status: COMPLETED | OUTPATIENT
Start: 2022-12-30 | End: 2022-12-30

## 2022-12-30 RX ADMIN — BUPIVACAINE HYDROCHLORIDE 9 ML: 2.5 INJECTION, SOLUTION EPIDURAL; INFILTRATION; INTRACAUDAL; PERINEURAL at 11:04

## 2022-12-30 RX ADMIN — LIDOCAINE HYDROCHLORIDE 5 ML: 10 INJECTION, SOLUTION EPIDURAL; INFILTRATION; INTRACAUDAL; PERINEURAL at 11:01

## 2022-12-30 RX ADMIN — METHYLPREDNISOLONE ACETATE 80 MG: 80 INJECTION, SUSPENSION INTRA-ARTICULAR; INTRALESIONAL; INTRAMUSCULAR; SOFT TISSUE at 11:04

## 2022-12-30 NOTE — H&P
H and P reviewed from previous visit and no changes to patient's clinical presentation. Will proceed with procedure as planned. Patient denies history of DM and being on blood thinners.    Rodolfo Willams DO  Pain Management   [facility]

## 2022-12-30 NOTE — PROCEDURES
PROCEDURE:  Bilateral L 4, 5 and SA MBB     INDICATION: Patient complains of pain in the lower back, bilateral.  Pain increases on extension of the spine, facet tenderness present.       PROCEDURE DETAILS:   After obtaining written informed consent patient was taken to the procedure room. Pre-procedure blood pressure and pulse were stable and recorded in patients clinic chart. The patient was placed in a prone position and the lower back was prepped with chloraprep and draped in the usual sterile fashion.  The skin was infiltrated with 1% lidocaine at the junction of transverse process with the vertebral body at the left L 4 level. A 22-gauge, 3.5-inch needle was inserted into the lumbar medial branch under radiographic guidance. Both AP and lateral views were obtained.   Following placement of the needle and negative aspiration, 1.2 cc mixture of bupivacaine 0.25% with depo-medrol was injected  The identical procedure was performed on left L5 and SA medial branch was repeated on right side at L4, L5, Sa.  A total of 9 cc of 0.25% bupivacaine with 80 mg of Depo-medrol was injected. During the procedure there was no evidence of CSF, paresthesias or heme.    After the procedure the needles were removed. Skin was cleaned and a sterile dressing was applied.    Following the procedure the patient's vital signs were stable. The patient was discharged home in good condition after being given discharge instructions.    COMPLICATIONS None    Rodolfo Willams DO  Pain Management   [facility]

## 2023-01-05 ENCOUNTER — OFFICE VISIT (OUTPATIENT)
Dept: CARDIOLOGY | Facility: CLINIC | Age: 33
End: 2023-01-05
Payer: MEDICAID

## 2023-01-05 VITALS
HEART RATE: 93 BPM | DIASTOLIC BLOOD PRESSURE: 76 MMHG | OXYGEN SATURATION: 100 % | HEIGHT: 60 IN | SYSTOLIC BLOOD PRESSURE: 110 MMHG | WEIGHT: 82 LBS | BODY MASS INDEX: 16.1 KG/M2

## 2023-01-05 DIAGNOSIS — F17.200 SMOKER: ICD-10-CM

## 2023-01-05 DIAGNOSIS — R07.2 PRECORDIAL PAIN: Primary | ICD-10-CM

## 2023-01-05 DIAGNOSIS — Z82.49 FAMILY HISTORY OF PREMATURE CAD: ICD-10-CM

## 2023-01-05 PROCEDURE — 93000 ELECTROCARDIOGRAM COMPLETE: CPT | Performed by: INTERNAL MEDICINE

## 2023-01-05 PROCEDURE — 99214 OFFICE O/P EST MOD 30 MIN: CPT | Performed by: INTERNAL MEDICINE

## 2023-01-05 NOTE — PROGRESS NOTES
Subjective:     Encounter Date:01/05/2023      Patient ID: Abby Glover is a 32 y.o. female.    Chief Complaint : Follow-up for chest pain, positive family history, cigarette smoker    History of Present Illness      Ms. Abby Glover has PMH of    Very strong family history of premature CAD father MI in 40s Brother had fatal MI at 40  Normal coronary CTA at [facility] 11/22/2021  Anxiety, depression cholecystectomy, hysterectomy, tubal ligation, D&C  Cigarette smoker  Tramadol, acetaminophen codeine       Here for follow-up.  Patient is complaining of chest pain which comes on with stressful situation relieved with relaxation.  Continues to smoke in spite of counseling.      Patient's arterial blood pressure is 110/76, heart rate 73 bpm, O2 sat of 100% on room air.    Patient was seen in the emergency room 9/21/2022 for chest pain underwent treadmill where she had chest pain on the treadmill but EKG was normal.  Underwent coronary CTA done at [facility], 11/22/2021 which revealed normal coronary CTA.  Labs from 9/20/2021 revealed normal troponin and D-dimer and BNP CMP and CBC.      Assessment :     Chest pain with stressful situation consistent with angina  Normal coronary CTA  Cigarette smoker  Positive family history of premature CAD        Recommendations and plan :     Reviewed EKG results with patient  Reviewed coronary CTA results.  Reviewed risk factors for CAD counseled on smoking cessation  Follow-up with PMD to evaluate other etiologies for patient's chest pain  Will check lipid profile before next visit.           Procedures CTA done in [facility] 11/22/2021 revealed no obstructive CAD.        ECG 12 Lead    Date/Time: 1/5/2023 2:27 PM  Performed by: Tomas Negron MD  Authorized by: Tomas Negron MD   Comparison: compared with previous ECG from 9/20/2021  Comparison to previous ECG: EKG done today reviewed/interpreted by me reveals sinus rhythm with rate of 73 bpm  with no new change compared EKG from 9/20/2021              Copied text in this portion of the note has been reviewed and is accurate as of 1/5/2023  The following portions of the patient's history were reviewed and updated as appropriate: allergies, current medications, past family history, past medical history, past social history, past surgical history and problem list.    Assessment:         MDM    No diagnosis found.       Plan:               Past Medical History:  Past Medical History:   Diagnosis Date   • Anxiety    • Depression    • Hip pain, right    • Low back pain      Past Surgical History:  Past Surgical History:   Procedure Laterality Date   • CHOLECYSTECTOMY     • DILATATION AND CURETTAGE     • HYSTERECTOMY     • INNER EAR SURGERY     • TUBAL ABDOMINAL LIGATION        Allergies:  Allergies   Allergen Reactions   • Tramadol Shortness Of Breath   • Tramadol Hcl Unknown (See Comments)   • Acetaminophen-Codeine Unknown (See Comments)   • Other Unknown (See Comments)     Muscle relaxers     Home Meds:  Current Meds:     Current Outpatient Medications:   •  acetaminophen (TYLENOL) 500 MG tablet, Take 1,000 mg by mouth Every 6 (Six) Hours As Needed for Mild Pain., Disp: , Rfl:   •  aspirin 81 MG EC tablet, Take 81 mg by mouth Daily., Disp: , Rfl:   •  brompheniramine-pseudoephedrine-DM 30-2-10 MG/5ML syrup, Take 5 mL by mouth 4 (Four) Times a Day As Needed for Congestion, Cough or Allergies., Disp: 118 mL, Rfl: 0  •  estradiol (ESTRACE) 2 MG tablet, Take 1 tablet by mouth 3 (Three) Times a Day., Disp: , Rfl:   •  HYDROcodone-acetaminophen (Norco) 5-325 MG per tablet, Take 1 tablet by mouth Every 6 (Six) Hours As Needed for Severe Pain., Disp: 30 tablet, Rfl: 0  •  ibuprofen (ADVIL,MOTRIN) 800 MG tablet, Take 1 tablet by mouth Every 6 (Six) Hours As Needed for Moderate Pain., Disp: 90 tablet, Rfl: 2  •  Lidocaine Viscous HCl (XYLOCAINE) 2 % solution, , Disp: , Rfl:   Social History:   Social History      Tobacco Use   • Smoking status: Every Day     Packs/day: 1.00     Types: Cigarettes   • Smokeless tobacco: Never   Substance Use Topics   • Alcohol use: Yes     Comment: Occ.      Family History:  Family History   Problem Relation Age of Onset   • No Known Problems Mother    • Heart disease Father    • Heart attack Father    • Heart attack Brother    • Heart disease Brother               Review of Systems   Constitutional: Negative for malaise/fatigue.   Cardiovascular: Positive for chest pain. Negative for leg swelling and palpitations.   Respiratory: Positive for shortness of breath.    Skin: Negative for rash.   Neurological: Positive for light-headedness. Negative for dizziness and numbness.     All other systems are negative         Objective:     Physical Exam  There were no vitals taken for this visit.  General:  Appears in no acute distress  Eyes: Sclera is anicteric,  conjunctiva is clear   HEENT:  No JVD.  No carotid bruits  Respiratory: Respirations regular and unlabored at rest.  Clear to auscultation  Cardiovascular: S1,S2 Regular rate and rhythm. No murmur, rub or gallop auscultated.   Extremities: No digital clubbing or cyanosis, no edema  Skin: Color pink. Skin warm and dry to touch. No rashes  No xanthoma  Neuro: Alert and awake.    Lab Reviewed:         Tomas Negron MD  1/5/2023 11:47 EST      EMR Dragon/Transcription:   \"Dictated utilizing Dragon dictation\".
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
## 2023-01-18 ENCOUNTER — OFFICE VISIT (OUTPATIENT)
Dept: PAIN MEDICINE | Facility: CLINIC | Age: 33
End: 2023-01-18
Payer: MEDICAID

## 2023-01-18 VITALS
RESPIRATION RATE: 16 BRPM | DIASTOLIC BLOOD PRESSURE: 63 MMHG | SYSTOLIC BLOOD PRESSURE: 101 MMHG | OXYGEN SATURATION: 98 % | HEART RATE: 90 BPM

## 2023-01-18 DIAGNOSIS — S32.10XD CLOSED FRACTURE OF SACRUM AND COCCYX WITH ROUTINE HEALING, SUBSEQUENT ENCOUNTER: ICD-10-CM

## 2023-01-18 DIAGNOSIS — M47.816 LUMBAR SPONDYLOSIS: Primary | ICD-10-CM

## 2023-01-18 DIAGNOSIS — S32.2XXD CLOSED FRACTURE OF SACRUM AND COCCYX WITH ROUTINE HEALING, SUBSEQUENT ENCOUNTER: ICD-10-CM

## 2023-01-18 PROCEDURE — 99214 OFFICE O/P EST MOD 30 MIN: CPT | Performed by: STUDENT IN AN ORGANIZED HEALTH CARE EDUCATION/TRAINING PROGRAM

## 2023-01-18 RX ORDER — CELECOXIB 100 MG/1
100 CAPSULE ORAL 2 TIMES DAILY PRN
Qty: 60 CAPSULE | Refills: 1 | Status: SHIPPED | OUTPATIENT
Start: 2023-01-18 | End: 2023-01-31

## 2023-01-31 ENCOUNTER — OFFICE VISIT (OUTPATIENT)
Dept: FAMILY MEDICINE CLINIC | Facility: CLINIC | Age: 33
End: 2023-01-31
Payer: MEDICAID

## 2023-01-31 VITALS
SYSTOLIC BLOOD PRESSURE: 112 MMHG | HEART RATE: 106 BPM | WEIGHT: 80.8 LBS | BODY MASS INDEX: 15.25 KG/M2 | DIASTOLIC BLOOD PRESSURE: 64 MMHG | OXYGEN SATURATION: 98 % | HEIGHT: 61 IN

## 2023-01-31 DIAGNOSIS — R63.4 WEIGHT LOSS: ICD-10-CM

## 2023-01-31 DIAGNOSIS — F33.2 SEVERE EPISODE OF RECURRENT MAJOR DEPRESSIVE DISORDER, WITHOUT PSYCHOTIC FEATURES: ICD-10-CM

## 2023-01-31 DIAGNOSIS — K21.9 GASTROESOPHAGEAL REFLUX DISEASE WITHOUT ESOPHAGITIS: ICD-10-CM

## 2023-01-31 DIAGNOSIS — S32.10XA CLOSED FRACTURE OF SACRUM, UNSPECIFIED FRACTURE MORPHOLOGY, INITIAL ENCOUNTER: ICD-10-CM

## 2023-01-31 DIAGNOSIS — F41.9 ANXIETY: Primary | ICD-10-CM

## 2023-01-31 DIAGNOSIS — R11.2 NAUSEA AND VOMITING, UNSPECIFIED VOMITING TYPE: ICD-10-CM

## 2023-01-31 PROCEDURE — 99214 OFFICE O/P EST MOD 30 MIN: CPT | Performed by: NURSE PRACTITIONER

## 2023-01-31 PROCEDURE — 90471 IMMUNIZATION ADMIN: CPT | Performed by: NURSE PRACTITIONER

## 2023-01-31 PROCEDURE — 90677 PCV20 VACCINE IM: CPT | Performed by: NURSE PRACTITIONER

## 2023-01-31 RX ORDER — IBUPROFEN 800 MG/1
TABLET ORAL
COMMUNITY
Start: 2023-01-23

## 2023-01-31 RX ORDER — HYDROXYZINE HYDROCHLORIDE 10 MG/1
10 TABLET, FILM COATED ORAL 3 TIMES DAILY PRN
Qty: 40 TABLET | Refills: 0 | Status: SHIPPED | OUTPATIENT
Start: 2023-01-31

## 2023-01-31 RX ORDER — OMEPRAZOLE 20 MG/1
20 CAPSULE, DELAYED RELEASE ORAL DAILY
Qty: 30 CAPSULE | Refills: 2 | Status: SHIPPED | OUTPATIENT
Start: 2023-01-31

## 2023-01-31 RX ORDER — FLUOXETINE 10 MG/1
10 CAPSULE ORAL DAILY
Qty: 30 CAPSULE | Refills: 1 | Status: SHIPPED | OUTPATIENT
Start: 2023-01-31

## 2023-02-01 ENCOUNTER — CLINICAL SUPPORT (OUTPATIENT)
Dept: FAMILY MEDICINE CLINIC | Facility: CLINIC | Age: 33
End: 2023-02-01
Payer: MEDICAID

## 2023-02-01 DIAGNOSIS — R07.2 PRECORDIAL PAIN: ICD-10-CM

## 2023-02-01 PROCEDURE — 36415 COLL VENOUS BLD VENIPUNCTURE: CPT | Performed by: NURSE PRACTITIONER

## 2023-02-01 PROCEDURE — 85027 COMPLETE CBC AUTOMATED: CPT | Performed by: NURSE PRACTITIONER

## 2023-02-01 PROCEDURE — 84443 ASSAY THYROID STIM HORMONE: CPT | Performed by: NURSE PRACTITIONER

## 2023-02-01 NOTE — PROGRESS NOTES
Venipuncture Blood Specimen Collection  Venipuncture performed in the right arm by Angeles Miranda MA with good hemostasis. Patient tolerated the procedure well without complications.   02/01/23   Angeles Miranda MA

## 2023-02-02 LAB
DEPRECATED RDW RBC AUTO: 42.9 FL (ref 37–54)
ERYTHROCYTE [DISTWIDTH] IN BLOOD BY AUTOMATED COUNT: 11.8 % (ref 12.3–15.4)
HCT VFR BLD AUTO: 40.9 % (ref 34–46.6)
HGB BLD-MCNC: 13.7 G/DL (ref 12–15.9)
MCH RBC QN AUTO: 33 PG (ref 26.6–33)
MCHC RBC AUTO-ENTMCNC: 33.5 G/DL (ref 31.5–35.7)
MCV RBC AUTO: 98.6 FL (ref 79–97)
PLATELET # BLD AUTO: 315 10*3/MM3 (ref 140–450)
PMV BLD AUTO: 9.9 FL (ref 6–12)
RBC # BLD AUTO: 4.15 10*6/MM3 (ref 3.77–5.28)
TSH SERPL DL<=0.05 MIU/L-ACNC: 0.46 UIU/ML (ref 0.27–4.2)
WBC NRBC COR # BLD: 7.27 10*3/MM3 (ref 3.4–10.8)

## 2023-03-30 ENCOUNTER — TELEPHONE (OUTPATIENT)
Dept: FAMILY MEDICINE CLINIC | Facility: CLINIC | Age: 33
End: 2023-03-30
Payer: MEDICAID

## 2023-03-30 NOTE — TELEPHONE ENCOUNTER
Contacted patient to discuss missed appointment on 3/16/23 and to review our no-show policy. She indicated understanding. She is rescheduled for 5.11.23.

## 2023-04-11 ENCOUNTER — HOSPITAL ENCOUNTER (EMERGENCY)
Facility: HOSPITAL | Age: 33
Discharge: HOME | End: 2023-04-11
Attending: EMERGENCY MEDICINE | Admitting: EMERGENCY MEDICINE
Payer: MEDICAID

## 2023-04-11 VITALS
DIASTOLIC BLOOD PRESSURE: 83 MMHG | WEIGHT: 90 LBS | TEMPERATURE: 97.8 F | SYSTOLIC BLOOD PRESSURE: 119 MMHG | BODY MASS INDEX: 17.67 KG/M2 | HEART RATE: 99 BPM | HEIGHT: 60 IN | OXYGEN SATURATION: 98 % | RESPIRATION RATE: 20 BRPM

## 2023-04-11 DIAGNOSIS — L02.811 SCALP ABSCESS: Primary | ICD-10-CM

## 2023-04-11 DIAGNOSIS — R51.9 SCALP PAIN: ICD-10-CM

## 2023-04-11 PROCEDURE — 63710000001 ONDANSETRON ODT 4 MG TABLET DISPERSIBLE: Performed by: NURSE PRACTITIONER

## 2023-04-11 PROCEDURE — 87070 CULTURE OTHR SPECIMN AEROBIC: CPT | Performed by: NURSE PRACTITIONER

## 2023-04-11 PROCEDURE — 99283 EMERGENCY DEPT VISIT LOW MDM: CPT

## 2023-04-11 PROCEDURE — 87147 CULTURE TYPE IMMUNOLOGIC: CPT | Performed by: NURSE PRACTITIONER

## 2023-04-11 PROCEDURE — 87205 SMEAR GRAM STAIN: CPT | Performed by: NURSE PRACTITIONER

## 2023-04-11 PROCEDURE — 87186 SC STD MICRODIL/AGAR DIL: CPT | Performed by: NURSE PRACTITIONER

## 2023-04-11 RX ORDER — SULFAMETHOXAZOLE AND TRIMETHOPRIM 800; 160 MG/1; MG/1
1 TABLET ORAL 2 TIMES DAILY
Qty: 14 TABLET | Refills: 0 | Status: SHIPPED | OUTPATIENT
Start: 2023-04-11 | End: 2023-04-17 | Stop reason: SDUPTHER

## 2023-04-11 RX ORDER — LIDOCAINE HYDROCHLORIDE AND EPINEPHRINE 10; 10 MG/ML; UG/ML
10 INJECTION, SOLUTION INFILTRATION; PERINEURAL ONCE
Status: COMPLETED | OUTPATIENT
Start: 2023-04-11 | End: 2023-04-11

## 2023-04-11 RX ORDER — DICLOFENAC SODIUM 75 MG/1
75 TABLET, DELAYED RELEASE ORAL 2 TIMES DAILY PRN
Qty: 20 TABLET | Refills: 0 | Status: SHIPPED | OUTPATIENT
Start: 2023-04-11 | End: 2023-04-21

## 2023-04-11 RX ORDER — HYDROCODONE BITARTRATE AND ACETAMINOPHEN 5; 325 MG/1; MG/1
1 TABLET ORAL ONCE
Status: COMPLETED | OUTPATIENT
Start: 2023-04-11 | End: 2023-04-11

## 2023-04-11 RX ORDER — ONDANSETRON 4 MG/1
4 TABLET, ORALLY DISINTEGRATING ORAL ONCE
Status: COMPLETED | OUTPATIENT
Start: 2023-04-11 | End: 2023-04-11

## 2023-04-11 RX ORDER — SULFAMETHOXAZOLE AND TRIMETHOPRIM 800; 160 MG/1; MG/1
1 TABLET ORAL ONCE
Status: COMPLETED | OUTPATIENT
Start: 2023-04-11 | End: 2023-04-11

## 2023-04-11 RX ORDER — DIAPER,BRIEF,INFANT-TODD,DISP
1 EACH MISCELLANEOUS ONCE
Status: COMPLETED | OUTPATIENT
Start: 2023-04-11 | End: 2023-04-11

## 2023-04-11 RX ADMIN — ONDANSETRON 4 MG: 4 TABLET, ORALLY DISINTEGRATING ORAL at 10:48

## 2023-04-11 RX ADMIN — BACITRACIN 0.9 G: 500 OINTMENT TOPICAL at 10:18

## 2023-04-11 RX ADMIN — SULFAMETHOXAZOLE AND TRIMETHOPRIM 1 TABLET: 800; 160 TABLET ORAL at 10:18

## 2023-04-11 RX ADMIN — LIDOCAINE HYDROCHLORIDE AND EPINEPHRINE 5 ML: 10; 10 INJECTION, SOLUTION INFILTRATION; PERINEURAL at 10:23

## 2023-04-11 RX ADMIN — HYDROCODONE BITARTRATE AND ACETAMINOPHEN 1 TABLET: 5; 325 TABLET ORAL at 10:48

## 2023-04-11 NOTE — ED PROVIDER NOTES
Subjective   History of Present Illness  Patient is a 32-year-old female who complains of pain swelling and drainage in the left posterior scalp she states the raised area has been there for several years but over the last several days it is began to drain and been extremely painful.  She is tearful before my exam.  She rates her pain a 9/10 and she states it is worse when I push on it.        Review of Systems   Constitutional: Negative for chills, fatigue and fever.   HENT: Negative for congestion, tinnitus and trouble swallowing.    Eyes: Negative for photophobia, discharge and redness.   Respiratory: Negative for cough and shortness of breath.    Cardiovascular: Negative for chest pain and palpitations.   Gastrointestinal: Negative for abdominal pain, diarrhea, nausea and vomiting.   Genitourinary: Negative for dysuria, frequency and urgency.   Musculoskeletal: Negative for back pain, joint swelling and myalgias.   Skin: Positive for wound. Negative for rash.        Scalp pain wound with drainage posterior scalp   Neurological: Negative for dizziness and headaches.   Psychiatric/Behavioral: Negative for confusion.   All other systems reviewed and are negative.      Past Medical History:   Diagnosis Date   • Anxiety    • Depression    • Hip pain, right    • Low back pain        Allergies   Allergen Reactions   • Tramadol Shortness Of Breath   • Tramadol Hcl Unknown (See Comments)   • Acetaminophen-Codeine Unknown (See Comments)   • Other Unknown (See Comments)     Muscle relaxers       Past Surgical History:   Procedure Laterality Date   • CHOLECYSTECTOMY     • DILATATION AND CURETTAGE     • HYSTERECTOMY     • INNER EAR SURGERY     • TUBAL ABDOMINAL LIGATION         Family History   Problem Relation Age of Onset   • No Known Problems Mother    • Heart disease Father    • Heart attack Father    • Heart attack Brother    • Heart disease Brother        Social History     Socioeconomic History   • Marital status:     Tobacco Use   • Smoking status: Every Day     Packs/day: 0.50     Years: 10.00     Pack years: 5.00     Types: Cigarettes   • Smokeless tobacco: Never   Vaping Use   • Vaping Use: Former   • Substances: Nicotine   • Devices: Disposable   Substance and Sexual Activity   • Alcohol use: Yes     Comment: Occ.   • Drug use: Not Currently     Types: Marijuana   • Sexual activity: Yes     Partners: Male           Objective   Physical Exam  Vitals reviewed.   Constitutional:       Appearance: Normal appearance. She is well-developed and normal weight.   HENT:      Head: Normocephalic and atraumatic.      Right Ear: External ear normal.      Left Ear: External ear normal.      Nose: Nose normal.      Mouth/Throat:      Mouth: Mucous membranes are moist.   Eyes:      Conjunctiva/sclera: Conjunctivae normal.      Pupils: Pupils are equal, round, and reactive to light.   Cardiovascular:      Rate and Rhythm: Normal rate and regular rhythm.      Heart sounds: Normal heart sounds.   Pulmonary:      Effort: Pulmonary effort is normal. No respiratory distress.      Breath sounds: Normal breath sounds. No wheezing.   Musculoskeletal:         General: No deformity. Normal range of motion.      Cervical back: Normal range of motion and neck supple.   Skin:     General: Skin is warm and dry.      Capillary Refill: Capillary refill takes less than 2 seconds.             Comments: There is a golf ball sized area on the posterior scalp left side that is red raised and very tender with drainage that has the hair dried around it-   Neurological:      Mental Status: She is alert and oriented to person, place, and time.      GCS: GCS eye subscore is 4. GCS verbal subscore is 5. GCS motor subscore is 6.      Cranial Nerves: No cranial nerve deficit.      Sensory: No sensory deficit.      Deep Tendon Reflexes: Reflexes normal.   Psychiatric:         Attention and Perception: Attention normal.         Mood and Affect: Mood is anxious.  "Affect is tearful.         Speech: Speech normal.         Behavior: Behavior normal. Behavior is cooperative.         Cognition and Memory: Cognition normal.         Incision & Drainage    Date/Time: 4/11/2023 10:51 AM  Performed by: Mimi Jolley APRN  Authorized by: Dariel Monroy MD     Consent:     Consent obtained:  Verbal    Consent given by:  Patient    Risks, benefits, and alternatives were discussed: yes      Risks discussed:  Incomplete drainage, infection and pain  Universal protocol:     Procedure explained and questions answered to patient or proxy's satisfaction: yes      Site/side marked: yes      Immediately prior to procedure, a time out was called: yes      Patient identity confirmed:  Verbally with patient, arm band and hospital-assigned identification number  Location:     Type:  Abscess    Location:  Head    Head location:  Scalp (Posterior left-sided)  Pre-procedure details:     Skin preparation:  Povidone-iodine  Sedation:     Sedation type:  None  Anesthesia:     Anesthesia method:  Local infiltration    Local anesthetic:  Lidocaine 1% WITH epi  Procedure type:     Complexity:  Complex  Procedure details:     Ultrasound guidance: no      Needle aspiration: no      Incision types:  Cruciate    Wound management:  Irrigated with saline and probed and deloculated    Drainage:  Purulent    Drainage amount:  Moderate    Packing materials:  1/4 in iodoform gauze  Post-procedure details:     Procedure completion:  Tolerated well, no immediate complications               ED Course      /83 (BP Location: Left arm, Patient Position: Sitting)   Pulse 99   Temp 97.8 °F (36.6 °C) (Temporal)   Resp 20   Ht 152.4 cm (60\")   Wt 40.8 kg (90 lb)   SpO2 98%   BMI 17.58 kg/m²   Labs Reviewed   WOUND CULTURE     Medications   lidocaine 1% - EPINEPHrine 1:448902 (XYLOCAINE W/EPI) 1 %-1:251529 injection 10 mL (5 mL Injection Given by Other 4/11/23 1023)   bacitracin ointment 0.9 g (0.9 g " Topical Given 4/11/23 1018)   sulfamethoxazole-trimethoprim (BACTRIM DS,SEPTRA DS) 800-160 MG per tablet 1 tablet (1 tablet Oral Given 4/11/23 1018)   HYDROcodone-acetaminophen (NORCO) 5-325 MG per tablet 1 tablet (1 tablet Oral Given 4/11/23 1048)   ondansetron ODT (ZOFRAN-ODT) disintegrating tablet 4 mg (4 mg Oral Given 4/11/23 1048)     No radiology results for the last day                                       Medical Decision Making  Patient is a 32-year-old female who presents with a sore area to the posterior scalp with drainage concerning for abscess or infected cystic lesion.  The patient had above exam and procedure as documented the patient was very tearful and was difficult to get numb a did not loculated or probe is much as I wanted to due to her crying and crying out in pain and the inability to allow me to anesthetize her thoroughly.  I did pack it as best I can I advised her that she will need to follow-up with the surgeon for further evaluation and treatment she can have this referral placed by her primary care provider who she should see in 3 to 5 days for wound recheck she was given a Norco for pain here in the emergency room as she does have a ride she was also given her first Bactrim and prescription was sent she verbalized understood the need for follow-up    Scalp abscess: complicated acute illness or injury  Scalp pain: complicated acute illness or injury  Amount and/or Complexity of Data Reviewed  External Data Reviewed: notes.  Labs: ordered. Decision-making details documented in ED Course.     Details: Wound culture obtained      Risk  OTC drugs.  Prescription drug management.          Final diagnoses:   Scalp abscess   Scalp pain       ED Disposition  ED Disposition     ED Disposition   Discharge    Condition   Stable    Comment   --             Maribeth Bustillo, APRN  [address]  [place] [address]  [phone]    In 5 days  For wound re-check         Medication List      New  Prescriptions    diclofenac 75 MG EC tablet  Commonly known as: VOLTAREN  Take 1 tablet by mouth 2 (Two) Times a Day As Needed (pain).     sulfamethoxazole-trimethoprim 800-160 MG per tablet  Commonly known as: BACTRIM DS,SEPTRA DS  Take 1 tablet by mouth 2 (Two) Times a Day for 7 days.           Where to Get Your Medications      These medications were sent to [facility] [place] [address], [place] [address] - [phone]  - [phone] [address], [place]. [address] [place] [address]    Phone: [phone]   · diclofenac 75 MG EC tablet  · sulfamethoxazole-trimethoprim 800-160 MG per tablet          Mimi Jolley, APRN  04/11/23 1056

## 2023-04-11 NOTE — DISCHARGE INSTRUCTIONS
Take the packing out in 24 to 48 hours if it comes out prior to that it is okay    See your primary care provider for recheck in 3 to 5 days    Take the antibiotics till gone    Use Voltaren as needed for discomfort do not mix with Motrin ibuprofen Advil or Aleve you may use Tylenol with it    Return if worse

## 2023-04-11 NOTE — ED NOTES
Pt states she's had 2 small cysts to left side of head for over a year but for the past 2 days they have been swollen and very painful.  Denies drainage.

## 2023-04-11 NOTE — Clinical Note
[facility] EMERGENCY DEPARTMENT  [address] [place] [address]  Phone: [phone]    Abby Glover was seen and treated in our emergency department on 4/11/2023.  She may return to work on 04/12/2023.         Thank you for choosing [facility].    Janeen Kaye RN

## 2023-04-12 ENCOUNTER — TELEPHONE (OUTPATIENT)
Dept: FAMILY MEDICINE CLINIC | Facility: CLINIC | Age: 33
End: 2023-04-12
Payer: MEDICAID

## 2023-04-12 NOTE — TELEPHONE ENCOUNTER
Abby's Mother called to get a follow up scheduled for Abby. She was seen at ER today for a scalp abscess. An I &D was performed. Recommended to follow up in 5 days. No appt available. Please Advise.

## 2023-04-13 LAB
BACTERIA SPEC AEROBE CULT: ABNORMAL
GRAM STN SPEC: ABNORMAL
GRAM STN SPEC: ABNORMAL

## 2023-04-13 NOTE — PHARMACY RECOMMENDATION
Patient presented with pain swelling an drainage from left posterior scalp. I&D was done.Patient wound culture resulted with MRSA. Susceptible to Sulfonamides. Patient was given Rx for sulfamethoxazole/ trimethoprim. Therapy is appropriate coverage. No further follow-up required..    Microbiology Results (last 10 days)       Procedure Component Value - Date/Time    Wound Culture - Drainage, Scalp [[id number]]  (Abnormal)  (Susceptibility) Collected: 04/11/23 1038    Lab Status: Final result Specimen: Drainage from Scalp Updated: 04/13/23 0953     Wound Culture Moderate growth (3+) Staphylococcus aureus, MRSA     Comment:   Methicillin resistant Staphylococcus aureus, Patient may be an isolation risk.        Gram Stain Moderate (3+) WBCs per low power field      Few (2+) Gram positive cocci in pairs and clusters    Susceptibility        Staphylococcus aureus, MRSA      TREY      Clindamycin Susceptible      Erythromycin Resistant      Inducible Clindamycin Resistance Negative      Oxacillin Resistant      Rifampin Susceptible      Tetracycline Susceptible      Trimethoprim + Sulfamethoxazole Susceptible      Vancomycin Susceptible                       Susceptibility Comments       Staphylococcus aureus, MRSA    This isolate does not demonstrate inducible clindamycin resistance in vitro.    This isolate does not demonstrate inducible clindamycin resistance in vitro.                         Yamilex Campos, PharmD  4/13/2023 09:58 EDT

## 2023-04-17 ENCOUNTER — OFFICE VISIT (OUTPATIENT)
Dept: FAMILY MEDICINE CLINIC | Facility: CLINIC | Age: 33
End: 2023-04-17
Payer: MEDICAID

## 2023-04-17 VITALS
OXYGEN SATURATION: 99 % | HEART RATE: 100 BPM | WEIGHT: 84 LBS | BODY MASS INDEX: 16.49 KG/M2 | DIASTOLIC BLOOD PRESSURE: 78 MMHG | SYSTOLIC BLOOD PRESSURE: 108 MMHG | HEIGHT: 60 IN

## 2023-04-17 DIAGNOSIS — L02.811 ABSCESS, SCALP: Primary | ICD-10-CM

## 2023-04-17 PROCEDURE — 99213 OFFICE O/P EST LOW 20 MIN: CPT | Performed by: NURSE PRACTITIONER

## 2023-04-17 RX ORDER — SULFAMETHOXAZOLE AND TRIMETHOPRIM 800; 160 MG/1; MG/1
1 TABLET ORAL 2 TIMES DAILY
Qty: 14 TABLET | Refills: 0 | Status: SHIPPED | OUTPATIENT
Start: 2023-04-17 | End: 2023-04-24

## 2023-04-17 NOTE — PROGRESS NOTES
Chief Complaint  Chief Complaint   Patient presents with   • Hospital Follow Up Visit     Pt here for ER f/u from 4/11. Pt had one cyst drained and was told other cyst had to be surgically removed. Pt was not referred to surgeon in ER, was told PCP will refer her            Subjective          Abby Glover presents to [facility] for   History of Present Illness     Patient presents after ED visit for x2 cysts on posterior left scalp, had 2 bumps for several years on back of head, have recently enlarged and abscessed, one was drained at ED, she is on bactrim.       The following portions of the patient's history were reviewed and updated as appropriate: allergies, current medications, past family history, past medical history, past social history, past surgical history and problem list.    Past Medical History:   Diagnosis Date   • Anxiety    • Depression    • Hip pain, right    • Low back pain      Past Surgical History:   Procedure Laterality Date   • CHOLECYSTECTOMY     • DILATATION AND CURETTAGE     • HYSTERECTOMY     • INNER EAR SURGERY     • TUBAL ABDOMINAL LIGATION       Family History   Problem Relation Age of Onset   • No Known Problems Mother    • Heart disease Father    • Heart attack Father    • Heart attack Brother    • Heart disease Brother      Social History     Tobacco Use   • Smoking status: Every Day     Packs/day: 0.50     Years: 10.00     Pack years: 5.00     Types: Cigarettes   • Smokeless tobacco: Never   Substance Use Topics   • Alcohol use: Yes     Comment: Occ.       Current Outpatient Medications:   •  estradiol (ESTRACE) 2 MG tablet, Take 1 tablet by mouth 3 (Three) Times a Day., Disp: , Rfl:   •  ibuprofen (ADVIL,MOTRIN) 800 MG tablet, TAKE ONE (1) TABLET BY MOUTH EVERY 6 HOURS AS NEEDED FOR MODERATE PAIN, Disp: , Rfl:   •  sulfamethoxazole-trimethoprim (BACTRIM DS,SEPTRA DS) 800-160 MG per tablet, Take 1 tablet by mouth 2 (Two) Times a Day for 7 days.,  "Disp: 14 tablet, Rfl: 0    Objective   Vital Signs:   /78 (BP Location: Right arm, Patient Position: Sitting, Cuff Size: Small Adult)   Pulse 100   Ht 152.4 cm (60\")   Wt 38.1 kg (84 lb)   SpO2 99%   BMI 16.41 kg/m²           Physical Exam  Vitals and nursing note reviewed.   Constitutional:       General: She is not in acute distress.     Appearance: She is well-developed. She is not diaphoretic.   HENT:      Head:      Comments: x2 cysts quarter size, L posterior scalp, occipital region.   Neck:      Thyroid: No thyromegaly.      Vascular: No JVD.   Musculoskeletal:         General: No tenderness. Normal range of motion.   Skin:     General: Skin is warm and dry.   Neurological:      Mental Status: She is alert and oriented to person, place, and time.      Sensory: No sensory deficit.   Psychiatric:         Behavior: Behavior normal.         Thought Content: Thought content normal.         Judgment: Judgment normal.          Result Review :     Admission on 04/11/2023, Discharged on 04/11/2023   Component Date Value Ref Range Status   • Wound Culture 04/11/2023 Moderate growth (3+) Staphylococcus aureus, MRSA (A)   Final      Methicillin resistant Staphylococcus aureus, Patient may be an isolation risk.   • Gram Stain 04/11/2023 Moderate (3+) WBCs per low power field   Final   • Gram Stain 04/11/2023 Few (2+) Gram positive cocci in pairs and clusters   Final                           Assessment and Plan    Diagnoses and all orders for this visit:    1. Abscess, scalp (Primary)  -     Ambulatory Referral to General Surgery    Other orders  -     sulfamethoxazole-trimethoprim (BACTRIM DS,SEPTRA DS) 800-160 MG per tablet; Take 1 tablet by mouth 2 (Two) Times a Day for 7 days.  Dispense: 14 tablet; Refill: 0    Culture per ER shows MRSA, continue another 7 days of Bactrim, referred to general surgery for further evaluation, debridement etc.     I spent 20 minutes caring for Abby Glover on this date of " service. This time includes time spent by me in the following activities: preparing for the visit, reviewing tests, performing a medically appropriate examination and/or evaluation , counseling and educating the patient/family/caregiver, ordering medications, tests, or procedures and documenting information in the medical record        Follow Up     No follow-ups on file.  Patient was given instructions and counseling regarding her condition or for health maintenance advice. Please see specific information pulled into the AVS if appropriate.        Part of this note may be an electronic transcription/translation of spoken language to printed text using the Dragon Dictation System

## 2023-04-21 ENCOUNTER — OFFICE VISIT (OUTPATIENT)
Dept: SURGERY | Facility: CLINIC | Age: 33
End: 2023-04-21
Payer: MEDICAID

## 2023-04-21 VITALS
OXYGEN SATURATION: 98 % | SYSTOLIC BLOOD PRESSURE: 124 MMHG | WEIGHT: 89 LBS | TEMPERATURE: 97.3 F | HEART RATE: 81 BPM | BODY MASS INDEX: 17.47 KG/M2 | DIASTOLIC BLOOD PRESSURE: 89 MMHG | HEIGHT: 60 IN

## 2023-04-21 NOTE — PROGRESS NOTES
General Surgery History and Physical      Referring Provider: LEEANNA Avalos    Chief Complaint:    Resected scalp cysts    History of Present Illness:    Abby Glover is a 32 y.o. who presents today for evaluation of 2 infected scalp cyst.  She reports she has had 2 small spots on the backside of her head on the left for greater than 10 years.  She reports intermittently they just scab over but has never had much problems with them.  About 2 and half weeks ago the increased in size and she had significant pain.  Both areas were lanced and drained and she was started on antibiotics.  She is referred for evaluation for possible scalp cyst excision.  She reports still some discomfort but significantly improved pain since the time of the procedure.    Past Medical History:   Past Medical History:   Diagnosis Date   • Anxiety    • Depression    • Hip pain, right    • Low back pain       Past Surgical History:    Past Surgical History:   Procedure Laterality Date   • CHOLECYSTECTOMY     • DILATATION AND CURETTAGE     • HYSTERECTOMY     • INNER EAR SURGERY     • TUBAL ABDOMINAL LIGATION       Family History:    Family History   Problem Relation Age of Onset   • No Known Problems Mother    • Heart disease Father    • Heart attack Father    • Heart attack Brother    • Heart disease Brother      Social History:    Social History     Socioeconomic History   • Marital status:    Tobacco Use   • Smoking status: Every Day     Packs/day: 0.50     Years: 10.00     Pack years: 5.00     Types: Cigarettes   • Smokeless tobacco: Never   Vaping Use   • Vaping Use: Former   • Substances: Nicotine   • Devices: Disposable   Substance and Sexual Activity   • Alcohol use: Yes     Comment: Occ.   • Drug use: Not Currently     Types: Marijuana   • Sexual activity: Yes     Partners: Male     Allergies:   Allergies   Allergen Reactions   • Tramadol Shortness Of Breath   • Tramadol Hcl Unknown (See Comments)   •  Acetaminophen-Codeine Unknown (See Comments)   • Other Unknown (See Comments)     Muscle relaxers     Medications:     Current Outpatient Medications:   •  estradiol (ESTRACE) 2 MG tablet, Take 1 tablet by mouth 3 (Three) Times a Day., Disp: , Rfl:   •  ibuprofen (ADVIL,MOTRIN) 800 MG tablet, TAKE ONE (1) TABLET BY MOUTH EVERY 6 HOURS AS NEEDED FOR MODERATE PAIN, Disp: , Rfl:   •  sulfamethoxazole-trimethoprim (BACTRIM DS,SEPTRA DS) 800-160 MG per tablet, Take 1 tablet by mouth 2 (Two) Times a Day for 7 days., Disp: 14 tablet, Rfl: 0    Radiology/Endoscopy:    None applicable    Labs:    Wound culture showed MRSA from 4/11/2023    Review of Systems:   As noted above in HPI    Physical Exam:   No acute distress, alert  Nonlabored respirations  Left inferior posterior scalp with 2 wounds with large matted crust overlying the wounds; once overlying crust removed there is 1 subcentimeter open lesion with a healthy wound base and another 1 cm area of induration with healing incision sites    Assessment and Plan:  Abby Glover is a 32 y.o. female with 2 scalp cysts which were recently infected and I&D performed.    - Recommend complete course of antibiotics as prescribed by primary  - Recommend clean area with soap and water directly over the wound to keep them open and draining  - Can follow-up in the office in 2 to 3 weeks once acute infection has subsided to evaluate for possible excision however the area of induration is too large at this time to proceed with any surgical intervention    Yoselin Worthington MD  General Surgery

## 2023-05-04 NOTE — PROGRESS NOTES
General Surgery History and Physical      Referring Provider: No ref. provider found    Chief Complaint:    Resected scalp cysts    History of Present Illness:    Abby Glover is a 32 y.o. who was previously seen in clinic for evaluation of 2 infected scalp cyst.  At that time she had extensive dried exudate over the 2 areas and there was still a moderate amount of inflammation.  She presents today for reevaluation for possible excision.  She notes she is still having some discomfort but significantly decreased and there is no longer any drainage.    Past Medical History:   Past Medical History:   Diagnosis Date   • Anxiety    • Depression    • Hip pain, right    • Low back pain       Past Surgical History:    Past Surgical History:   Procedure Laterality Date   • CHOLECYSTECTOMY     • DILATATION AND CURETTAGE     • HYSTERECTOMY     • INNER EAR SURGERY     • TUBAL ABDOMINAL LIGATION       Family History:    Family History   Problem Relation Age of Onset   • No Known Problems Mother    • Heart disease Father    • Heart attack Father    • Heart attack Brother    • Heart disease Brother      Social History:    Social History     Socioeconomic History   • Marital status:    Tobacco Use   • Smoking status: Every Day     Packs/day: 0.50     Years: 10.00     Pack years: 5.00     Types: Cigarettes   • Smokeless tobacco: Never   Vaping Use   • Vaping Use: Former   • Substances: Nicotine   • Devices: Disposable   Substance and Sexual Activity   • Alcohol use: Yes     Comment: Occ.   • Drug use: Not Currently     Types: Marijuana   • Sexual activity: Yes     Partners: Male     Allergies:   Allergies   Allergen Reactions   • Tramadol Shortness Of Breath   • Tramadol Hcl Unknown (See Comments)   • Acetaminophen-Codeine Unknown (See Comments)   • Other Unknown (See Comments)     Muscle relaxers     Medications:     Current Outpatient Medications:   •  estradiol (ESTRACE) 2 MG tablet, Take 1 tablet by mouth 3 (Three)  Times a Day., Disp: , Rfl:   •  ibuprofen (ADVIL,MOTRIN) 800 MG tablet, TAKE ONE (1) TABLET BY MOUTH EVERY 6 HOURS AS NEEDED FOR MODERATE PAIN, Disp: , Rfl:     Radiology/Endoscopy:    None applicable    Labs:    Wound culture showed MRSA from 4/11/2023    Review of Systems:   As noted above in HPI    Physical Exam:   No acute distress, alert  Nonlabored respirations  Left inferior posterior scalp with 2 healing wounds with approximately 1 cm x 1 cm area of scar tissue, no active drainage, no obvious active inflammation, on exam no clear underlying cyst    Assessment and Plan:  Abby Glover is a 32 y.o. female with 2 scalp cysts which were recently infected and I&D performed.  Healing well.    - Discussed with patient that on exam there is not an obvious cyst in either location and to excise the entire scarred area would be challenging as I would likely not be able to close the wounds  - Advised patient to follow-up if she notes any swelling to the area and can be reassessed at that time    Yoselin Worthington MD  General Surgery

## 2023-05-08 ENCOUNTER — OFFICE VISIT (OUTPATIENT)
Dept: SURGERY | Facility: CLINIC | Age: 33
End: 2023-05-08
Payer: MEDICAID

## 2023-05-08 VITALS
HEART RATE: 113 BPM | HEIGHT: 60 IN | WEIGHT: 86 LBS | TEMPERATURE: 97.3 F | BODY MASS INDEX: 16.88 KG/M2 | SYSTOLIC BLOOD PRESSURE: 107 MMHG | DIASTOLIC BLOOD PRESSURE: 83 MMHG | OXYGEN SATURATION: 100 %

## 2023-05-08 DIAGNOSIS — L72.9 SCALP CYST: Primary | ICD-10-CM

## 2023-05-11 RX ORDER — IBUPROFEN 800 MG/1
TABLET ORAL
Qty: 90 TABLET | Refills: 2 | Status: SHIPPED | OUTPATIENT
Start: 2023-05-11

## 2023-06-17 ENCOUNTER — APPOINTMENT (OUTPATIENT)
Dept: GENERAL RADIOLOGY | Facility: HOSPITAL | Age: 33
End: 2023-06-17
Payer: MEDICAID

## 2023-06-17 ENCOUNTER — APPOINTMENT (OUTPATIENT)
Dept: CT IMAGING | Facility: HOSPITAL | Age: 33
End: 2023-06-17
Payer: MEDICAID

## 2023-06-17 ENCOUNTER — HOSPITAL ENCOUNTER (EMERGENCY)
Facility: HOSPITAL | Age: 33
Discharge: HOME | End: 2023-06-17
Attending: EMERGENCY MEDICINE
Payer: MEDICAID

## 2023-06-17 VITALS
TEMPERATURE: 97.9 F | WEIGHT: 87.74 LBS | HEIGHT: 60 IN | HEART RATE: 65 BPM | SYSTOLIC BLOOD PRESSURE: 98 MMHG | DIASTOLIC BLOOD PRESSURE: 71 MMHG | RESPIRATION RATE: 18 BRPM | OXYGEN SATURATION: 98 % | BODY MASS INDEX: 17.23 KG/M2

## 2023-06-17 DIAGNOSIS — S13.9XXA NECK SPRAIN, INITIAL ENCOUNTER: ICD-10-CM
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
## 2023-06-17 DIAGNOSIS — S50.02XA CONTUSION OF LEFT ELBOW, INITIAL ENCOUNTER: ICD-10-CM

## 2023-06-17 DIAGNOSIS — S09.90XA CLOSED HEAD INJURY, INITIAL ENCOUNTER: ICD-10-CM

## 2023-06-17 PROCEDURE — 73070 X-RAY EXAM OF ELBOW: CPT

## 2023-06-17 PROCEDURE — 63710000001 ONDANSETRON ODT 4 MG TABLET DISPERSIBLE

## 2023-06-17 PROCEDURE — 70450 CT HEAD/BRAIN W/O DYE: CPT

## 2023-06-17 PROCEDURE — 72050 X-RAY EXAM NECK SPINE 4/5VWS: CPT

## 2023-06-17 RX ORDER — ONDANSETRON 4 MG/1
4 TABLET, ORALLY DISINTEGRATING ORAL ONCE
Status: COMPLETED | OUTPATIENT
Start: 2023-06-17 | End: 2023-06-17

## 2023-06-17 RX ORDER — ACETAMINOPHEN 500 MG
1000 TABLET ORAL ONCE
Status: COMPLETED | OUTPATIENT
Start: 2023-06-17 | End: 2023-06-17

## 2023-06-17 RX ADMIN — ONDANSETRON 4 MG: 4 TABLET, ORALLY DISINTEGRATING ORAL at 09:21

## 2023-06-17 RX ADMIN — ACETAMINOPHEN 1000 MG: 500 TABLET, FILM COATED ORAL at 09:21

## 2023-06-17 NOTE — ED PROVIDER NOTES
Subjective   History of Present Illness  Chief Complaint: Fall, headache      HPI: Patient is a 32-year-old female presents to the emergency room by private vehicle, states that at approximately 615 this morning she was walking through the garage when she slipped and fell striking the back of her head as well as her left elbow and left knee, she is unsure if she lost consciousness she has had persistent headache and dizziness.  She has had some nausea without vomiting, she denies photophobia or phonophobia.  Attempted treatment with ibuprofen 800 mg.      PCP: Keny    History provided by:  Patient    Review of Systems   Gastrointestinal:  Positive for nausea.   Neurological:  Positive for dizziness and headaches.     Past Medical History:   Diagnosis Date    Anxiety     Depression     Hip pain, right     Low back pain        Allergies   Allergen Reactions    Tramadol Shortness Of Breath    Tramadol Hcl Unknown (See Comments)    Acetaminophen-Codeine Unknown (See Comments)    Other Unknown (See Comments)     Muscle relaxers       Past Surgical History:   Procedure Laterality Date    CHOLECYSTECTOMY      DILATATION AND CURETTAGE      HYSTERECTOMY      INNER EAR SURGERY      TUBAL ABDOMINAL LIGATION         Family History   Problem Relation Age of Onset    No Known Problems Mother     Heart disease Father     Heart attack Father     Heart attack Brother     Heart disease Brother        Social History     Socioeconomic History    Marital status:    Tobacco Use    Smoking status: Every Day     Packs/day: 0.50     Years: 10.00     Pack years: 5.00     Types: Cigarettes    Smokeless tobacco: Never   Vaping Use    Vaping Use: Former    Substances: Nicotine    Devices: Disposable   Substance and Sexual Activity    Alcohol use: Yes     Comment: Occ.    Drug use: Not Currently     Types: Marijuana    Sexual activity: Yes     Partners: Male           Objective   Physical Exam  HENT:      Head: Normocephalic.      Ears:  "     Comments: No bleeding or drainage from the ears or nose.     Mouth/Throat:      Mouth: Mucous membranes are moist.      Pharynx: Oropharynx is clear.   Eyes:      Extraocular Movements: Extraocular movements intact.      Pupils: Pupils are equal, round, and reactive to light.   Neck:      Comments: C-spine midline tenderness, no bony step-offs or crepitus no acosta signs or raccoon eyes.  Cardiovascular:      Rate and Rhythm: Regular rhythm. Tachycardia present.      Pulses: Normal pulses.      Heart sounds: Normal heart sounds.   Pulmonary:      Effort: Pulmonary effort is normal.      Breath sounds: Normal breath sounds. No wheezing.   Abdominal:      General: Bowel sounds are normal.      Palpations: Abdomen is soft.   Musculoskeletal:      Left elbow: No deformity. Decreased range of motion (due to pain). Tenderness present in medial epicondyle.      Cervical back: Neck supple.      Left knee: Ecchymosis present.   Skin:     General: Skin is warm.   Neurological:      General: No focal deficit present.      Mental Status: She is alert and oriented to person, place, and time.   Psychiatric:         Mood and Affect: Mood is anxious.       Procedures           ED Course      BP 98/71   Pulse 65   Temp 97.9 °F (36.6 °C) (Oral)   Resp 18   Ht 152.4 cm (60\")   Wt 39.8 kg (87 lb 11.9 oz)   SpO2 98%   BMI 17.14 kg/m²   Labs Reviewed - No data to display  Medications   acetaminophen (TYLENOL) tablet 1,000 mg (1,000 mg Oral Given 6/17/23 0921)   ondansetron ODT (ZOFRAN-ODT) disintegrating tablet 4 mg (4 mg Oral Given 6/17/23 0921)     XR Spine Cervical Complete 4 or 5 View    Result Date: 6/17/2023  Impression: 1. No acute fracture or traumatic subluxation. Electronically Signed: Ishan Simon  6/17/2023 9:37 AM EDT  Workstation ID: [id number]    XR ELBOW 2 VIEW LEFT    Result Date: 6/17/2023  Impression: 1. No acute osseous abnormality of the left elbow. No elbow joint effusion. Electronically Signed: Ishan " Alfred  6/17/2023 9:25 AM EDT  Workstation ID: [id number]    CT Head Without Contrast    Result Date: 6/17/2023  Impression: 1. No acute intracranial abnormality. Specifically, no evidence of acute hemorrhage, mass effect or midline shift. Electronically Signed: Ishan Alfred  6/17/2023 10:06 AM EDT  Workstation ID: [id number]                                        Medical Decision Making  Patient presents to the emergency room after a mechanical fall, states that she struck the back of her head, left elbow and left knee, she came in independently by private vehicle ambulatory without difficulty.  On physical exam there is no nystagmus no acosta signs or raccoon eyes no bony step-offs or crepitus cysts of the cervical spine.  She is tearful on exam due to closed head injury she was given a dose of Tylenol as well as Zofran as she reported some nausea.  CT of the head was negative for intracranial hemorrhage or fracture, C-spine x-ray noted no abnormality as well.  X-ray of the left elbow was negative for fracture or dislocation on physical exam there is some ecchymosis pain on palpation we discussed following up with orthopedist for persistent pain.  We discussed close head injury and postconcussive syndrome, advised to rest and follow-up with primary care next week.  Patient gave verbal understanding of ED findings and plan for outpatient follow-up we discussed worrisome symptoms to monitor for and when to return to the emergency room, patient gave verbal understanding of discharge instructions, she was alert oriented nontoxic in appearance at time of discharge, denied further questions or complaints.    Chart review: 5/8/2023 patient seen outpatient with Dr. Worthington with general surgery related to scalp cyst that were incision and drained in the emergency room.      This document is intended for medical expert use only. Reading of this document by patients and/or patient's family without participating medical  staff guidance may result in misinterpretation and unintended morbidity. Any interpretation of such data is the responsibility of the patient and/or family member responsible for the patient in concert with their primary or specialist providers, not to be left for sources of online searches such as [employer], [employer] or similar queries. Relying on these approaches to knowledge may result in misinterpretation, misguided goals of care and even death should patients or family members try recommendations outside of the realm of professional medical care in a supervised inpatient environment.     Note: Please forgive punctuation, typographic/voice recognition errors, as portions of this document were created with Dragon Dictation, a voice recognition software.    Appropriate PPE worn during exam.    Problems Addressed:  Closed head injury, initial encounter: complicated acute illness or injury  Contusion of left elbow, initial encounter: complicated acute illness or injury  Fall, initial encounter: complicated acute illness or injury  Neck sprain, initial encounter: complicated acute illness or injury    Amount and/or Complexity of Data Reviewed  External Data Reviewed: notes.  Radiology: ordered and independent interpretation performed. Decision-making details documented in ED Course.    Risk  OTC drugs.  Prescription drug management.        Final diagnoses:   Fall, initial encounter   Closed head injury, initial encounter   Contusion of left elbow, initial encounter   Neck sprain, initial encounter       ED Disposition  ED Disposition       ED Disposition   Discharge    Condition   Stable    Comment   --               Maribeth Bustillo, APRN  [address] [address]  [phone]               Medication List      No changes were made to your prescriptions during this visit.            Jyoti Reyes, APRN  06/17/23 0439

## 2023-06-17 NOTE — DISCHARGE INSTRUCTIONS
Tylenol and ibuprofen as needed for discomfort, ice to the areas of pain 20 minutes at a time several times a day.    Rest    Follow-up with primary care    Return to the ER for new or worsening symptoms

## 2023-06-17 NOTE — Clinical Note
[facility] EMERGENCY DEPARTMENT  [address] [place] [address]  Phone: [phone]    Abby Glover was seen and treated in our emergency department on 6/17/2023.  She may return to work on 06/19/2023.         Thank you for choosing [facility].    Jyoti Reyes APRN

## 2023-12-13 ENCOUNTER — TELEPHONE (OUTPATIENT)
Dept: CARDIOLOGY | Facility: CLINIC | Age: 33
End: 2023-12-13
Payer: MEDICAID

## 2023-12-13 NOTE — TELEPHONE ENCOUNTER
Attempted to contact patient, mailbox is full. Orders mailed to patient to complete prior to f/u with Dr. Negron.

## 2024-06-11 ENCOUNTER — TELEPHONE (OUTPATIENT)
Dept: CARDIOLOGY | Facility: CLINIC | Age: 34
End: 2024-06-11

## 2024-06-11 NOTE — TELEPHONE ENCOUNTER
HUB WARMED TRANSFERRED PATIENT OVER. SHE WAS CRYING. COMPLAINING OF CHEST PAIN. I ADVISED SHE GO TO ER. PATIENT UNDERSTOOD.